# Patient Record
Sex: FEMALE | Race: WHITE | Employment: FULL TIME | URBAN - METROPOLITAN AREA
[De-identification: names, ages, dates, MRNs, and addresses within clinical notes are randomized per-mention and may not be internally consistent; named-entity substitution may affect disease eponyms.]

---

## 2017-03-15 ENCOUNTER — HOSPITAL ENCOUNTER (OUTPATIENT)
Dept: MRI IMAGING | Age: 57
Discharge: HOME | End: 2017-03-15
Attending: ORTHOPAEDIC SURGERY
Payer: COMMERCIAL

## 2017-03-15 VITALS — BODY MASS INDEX: 29.64 KG/M2 | WEIGHT: 170 LBS

## 2017-03-15 DIAGNOSIS — M54.16 LUMBAR RADICULOPATHY: ICD-10-CM

## 2017-03-15 DIAGNOSIS — M48.061 SPINAL STENOSIS, LUMBAR REGION, WITHOUT NEUROGENIC CLAUDICATION: ICD-10-CM

## 2017-03-15 PROCEDURE — 74011250636 HC RX REV CODE- 250/636: Performed by: ORTHOPAEDIC SURGERY

## 2017-03-15 PROCEDURE — A9585 GADOBUTROL INJECTION: HCPCS | Performed by: ORTHOPAEDIC SURGERY

## 2017-03-15 PROCEDURE — 72158 MRI LUMBAR SPINE W/O & W/DYE: CPT

## 2017-03-15 RX ADMIN — GADOBUTROL 7.5 ML: 604.72 INJECTION INTRAVENOUS at 08:11

## 2017-04-25 ENCOUNTER — HOSPITAL ENCOUNTER (OUTPATIENT)
Dept: PREADMISSION TESTING | Age: 57
Discharge: HOME | End: 2017-04-25
Payer: COMMERCIAL

## 2017-04-25 VITALS
WEIGHT: 182.32 LBS | RESPIRATION RATE: 16 BRPM | OXYGEN SATURATION: 96 % | HEIGHT: 63 IN | DIASTOLIC BLOOD PRESSURE: 86 MMHG | SYSTOLIC BLOOD PRESSURE: 141 MMHG | BODY MASS INDEX: 32.3 KG/M2 | TEMPERATURE: 98.5 F | HEART RATE: 80 BPM

## 2017-04-25 LAB
ABO + RH BLD: NORMAL
ALBUMIN SERPL BCP-MCNC: 4.4 G/DL (ref 3.5–5)
ALBUMIN/GLOB SERPL: 1.4 {RATIO} (ref 1.1–2.2)
ALP SERPL-CCNC: 108 U/L (ref 45–117)
ALT SERPL-CCNC: 37 U/L (ref 12–78)
ANION GAP BLD CALC-SCNC: 9 MMOL/L (ref 5–15)
APPEARANCE UR: CLEAR
APTT PPP: 28.7 SEC (ref 22.1–32.5)
AST SERPL W P-5'-P-CCNC: 23 U/L (ref 15–37)
ATRIAL RATE: 85 BPM
BACTERIA URNS QL MICRO: NEGATIVE /HPF
BASOPHILS # BLD AUTO: 0 K/UL (ref 0–0.1)
BASOPHILS # BLD: 1 % (ref 0–1)
BILIRUB DIRECT SERPL-MCNC: 0.1 MG/DL (ref 0–0.2)
BILIRUB SERPL-MCNC: 0.3 MG/DL (ref 0.2–1)
BILIRUB UR QL: NEGATIVE
BLOOD GROUP ANTIBODIES SERPL: NORMAL
BUN SERPL-MCNC: 15 MG/DL (ref 6–20)
BUN/CREAT SERPL: 25 (ref 12–20)
CALCIUM SERPL-MCNC: 9.1 MG/DL (ref 8.5–10.1)
CALCULATED P AXIS, ECG09: 59 DEGREES
CALCULATED R AXIS, ECG10: 18 DEGREES
CALCULATED T AXIS, ECG11: 28 DEGREES
CHLORIDE SERPL-SCNC: 102 MMOL/L (ref 97–108)
CO2 SERPL-SCNC: 30 MMOL/L (ref 21–32)
COLOR UR: NORMAL
CREAT SERPL-MCNC: 0.6 MG/DL (ref 0.55–1.02)
DIAGNOSIS, 93000: NORMAL
EOSINOPHIL # BLD: 0.1 K/UL (ref 0–0.4)
EOSINOPHIL NFR BLD: 2 % (ref 0–7)
EPITH CASTS URNS QL MICRO: NORMAL /LPF
ERYTHROCYTE [DISTWIDTH] IN BLOOD BY AUTOMATED COUNT: 13.2 % (ref 11.5–14.5)
EST. AVERAGE GLUCOSE BLD GHB EST-MCNC: 103 MG/DL
GLOBULIN SER CALC-MCNC: 3.1 G/DL (ref 2–4)
GLUCOSE SERPL-MCNC: 78 MG/DL (ref 65–100)
GLUCOSE UR STRIP.AUTO-MCNC: NEGATIVE MG/DL
HBA1C MFR BLD: 5.2 % (ref 4.2–6.3)
HCT VFR BLD AUTO: 45.1 % (ref 35–47)
HGB BLD-MCNC: 14.7 G/DL (ref 11.5–16)
HGB UR QL STRIP: NEGATIVE
HYALINE CASTS URNS QL MICRO: NORMAL /LPF (ref 0–5)
INR PPP: 1 (ref 0.9–1.1)
KETONES UR QL STRIP.AUTO: NEGATIVE MG/DL
LEUKOCYTE ESTERASE UR QL STRIP.AUTO: NEGATIVE
LYMPHOCYTES # BLD AUTO: 30 % (ref 12–49)
LYMPHOCYTES # BLD: 2 K/UL (ref 0.8–3.5)
MCH RBC QN AUTO: 29.2 PG (ref 26–34)
MCHC RBC AUTO-ENTMCNC: 32.6 G/DL (ref 30–36.5)
MCV RBC AUTO: 89.5 FL (ref 80–99)
MONOCYTES # BLD: 0.4 K/UL (ref 0–1)
MONOCYTES NFR BLD AUTO: 6 % (ref 5–13)
NEUTS SEG # BLD: 4 K/UL (ref 1.8–8)
NEUTS SEG NFR BLD AUTO: 61 % (ref 32–75)
NITRITE UR QL STRIP.AUTO: NEGATIVE
P-R INTERVAL, ECG05: 146 MS
PH UR STRIP: 7.5 [PH] (ref 5–8)
PLATELET # BLD AUTO: 301 K/UL (ref 150–400)
POTASSIUM SERPL-SCNC: 4.3 MMOL/L (ref 3.5–5.1)
PROT SERPL-MCNC: 7.5 G/DL (ref 6.4–8.2)
PROT UR STRIP-MCNC: NEGATIVE MG/DL
PROTHROMBIN TIME: 10.1 SEC (ref 9–11.1)
Q-T INTERVAL, ECG07: 382 MS
QRS DURATION, ECG06: 70 MS
QTC CALCULATION (BEZET), ECG08: 454 MS
RBC # BLD AUTO: 5.04 M/UL (ref 3.8–5.2)
RBC #/AREA URNS HPF: NORMAL /HPF (ref 0–5)
SODIUM SERPL-SCNC: 141 MMOL/L (ref 136–145)
SP GR UR REFRACTOMETRY: 1.01 (ref 1–1.03)
SPECIMEN EXP DATE BLD: NORMAL
THERAPEUTIC RANGE,PTTT: NORMAL SECS (ref 58–77)
UA: UC IF INDICATED,UAUC: NORMAL
UROBILINOGEN UR QL STRIP.AUTO: 0.2 EU/DL (ref 0.2–1)
VENTRICULAR RATE, ECG03: 85 BPM
WBC # BLD AUTO: 6.5 K/UL (ref 3.6–11)
WBC URNS QL MICRO: NORMAL /HPF (ref 0–4)

## 2017-04-25 PROCEDURE — 85610 PROTHROMBIN TIME: CPT | Performed by: ORTHOPAEDIC SURGERY

## 2017-04-25 PROCEDURE — 86900 BLOOD TYPING SEROLOGIC ABO: CPT | Performed by: ORTHOPAEDIC SURGERY

## 2017-04-25 PROCEDURE — 85025 COMPLETE CBC W/AUTO DIFF WBC: CPT | Performed by: ORTHOPAEDIC SURGERY

## 2017-04-25 PROCEDURE — 81001 URINALYSIS AUTO W/SCOPE: CPT | Performed by: ORTHOPAEDIC SURGERY

## 2017-04-25 PROCEDURE — 36415 COLL VENOUS BLD VENIPUNCTURE: CPT | Performed by: ORTHOPAEDIC SURGERY

## 2017-04-25 PROCEDURE — 80048 BASIC METABOLIC PNL TOTAL CA: CPT | Performed by: ORTHOPAEDIC SURGERY

## 2017-04-25 PROCEDURE — 85730 THROMBOPLASTIN TIME PARTIAL: CPT | Performed by: ORTHOPAEDIC SURGERY

## 2017-04-25 PROCEDURE — 93005 ELECTROCARDIOGRAM TRACING: CPT

## 2017-04-25 PROCEDURE — 83036 HEMOGLOBIN GLYCOSYLATED A1C: CPT | Performed by: ORTHOPAEDIC SURGERY

## 2017-04-25 PROCEDURE — 80076 HEPATIC FUNCTION PANEL: CPT | Performed by: ORTHOPAEDIC SURGERY
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
## 2017-04-25 NOTE — PERIOP NOTES
[facility]  PREOPERATIVE INSTRUCTIONS    Surgery Date: 5/1/2017  Surgery arrival time given by surgeon: NO   If no,[facility] [address] staff will call you between 4 PM- 8 PM the day before surgery with your arrival time. If your surgery is on a Monday, we will call you the preceding Friday. Please call [phone] after 8 PM if you did not receive your arrival time. 1. Please report at the designated time to the [address] [facility]. Bring your insurance card, photo identification, and any copayment ( if applicable). 2. You must have a responsible adult to drive you home. You need to have a responsible adult to stay with you the first 24 hours after surgery if you are going home the same day of your surgery and you should not drive a car for 24 hours following your surgery. 3. Nothing to eat or drink after midnight the night before surgery. This includes no water, gum, mints, coffee, juice, etc.  Please note special instructions, if applicable, below for medications. 4. MEDICATIONS TO TAKE THE MORNING OF SURGERY WITH A SIP OF WATER: none  5. You MAY take your pain medication the day of surgery with a sip of water. 6. No alcoholic beverages 24 hours before or after your surgery. 7. If you are being admitted to the hospital,please leave personal belongings/luggage in your car until you have an assigned hospital room number. 8. Stop Aspirin and/or any non-steroidal anti-inflammatory drugs (i.e. Ibuprofen, Naproxen, Advil, Aleve) as directed by your prescribing physician. You may take Tylenol. Stop herbal supplements 1 week prior to  surgery. 9. If you are currently taking Plavix, Coumadin,or any other blood-thinning/anticoagulant medication contact your prescribing physician for instructions. 10. Please wear comfortable clothes. Wear your glasses instead of contacts. We ask that all money, jewelry and valuables be left at home. Wear no make up, particularly mascara, the day of surgery.    11.  All body piercings, rings,and jewelry need to be removed and left at home. Please wear your hair loose or down. Please no pony-tails, buns, or any metal hair accessories. If you shower the morning of surgery, please do not apply any lotions, powders, or deodorants afterwards. Do not shave any body area within 24 hours of your surgery. 12. Please follow all instructions to avoid any potential surgical cancellation. 13. Should your physical condition change, (i.e. fever, cold, flu, etc.) please notify your surgeon as soon as possible. 14. It is important to be on time. If a situation occurs where you may be delayed, please call:  [phone] / [phone] on the day of surgery. 15. The Preadmission Testing staff can be reached at [phone]. 16. Special Instructions:  · Use Chlorhexidine Care Fusion wash and sponges 3 days prior to surgery as instructed. · Incentive spirometer given with instructions to practice at home and bring back to the hospital on the day of surgery. · [facility] will [facility] you if your Hemoglobin A1C is greater than 7.5. · Ensure/Glucerna  sample, nutritional information, and Ensure/Glucerna coupon given. · Pain pamphlet and Call Don't Fall reminder reviewed with patient. ·  parking is complimentary Monday - Friday 7 am - 5 pm  · Bring PTA Medication list day of surgery with the last doses taken documented  The patient was contacted  in person. She  verbalize  understanding of all instructions does not  need reinforcement.

## 2017-04-26 LAB
BACTERIA SPEC CULT: NORMAL
BACTERIA SPEC CULT: NORMAL
SERVICE CMNT-IMP: NORMAL

## 2017-04-30 ENCOUNTER — ANESTHESIA EVENT (OUTPATIENT)
Dept: SURGERY | Age: 57
DRG: 460 | End: 2017-04-30
Payer: COMMERCIAL

## 2017-05-01 ENCOUNTER — HOSPITAL ENCOUNTER (INPATIENT)
Age: 57
LOS: 3 days | Discharge: HOME HEALTH | DRG: 460 | End: 2017-05-04
Attending: ORTHOPAEDIC SURGERY | Admitting: ORTHOPAEDIC SURGERY
Payer: COMMERCIAL

## 2017-05-01 ENCOUNTER — APPOINTMENT (OUTPATIENT)
Dept: GENERAL RADIOLOGY | Age: 57
DRG: 460 | End: 2017-05-01
Attending: ORTHOPAEDIC SURGERY
Payer: COMMERCIAL

## 2017-05-01 ENCOUNTER — ANESTHESIA (OUTPATIENT)
Dept: SURGERY | Age: 57
DRG: 460 | End: 2017-05-01
Payer: COMMERCIAL

## 2017-05-01 PROBLEM — M48.062 LUMBAR STENOSIS WITH NEUROGENIC CLAUDICATION: Status: ACTIVE | Noted: 2017-05-01

## 2017-05-01 PROBLEM — M48.061 LUMBAR STENOSIS: Status: ACTIVE | Noted: 2017-05-01

## 2017-05-01 PROCEDURE — 0SG00AJ FUSION OF LUMBAR VERTEBRAL JOINT WITH INTERBODY FUSION DEVICE, POSTERIOR APPROACH, ANTERIOR COLUMN, OPEN APPROACH: ICD-10-PCS | Performed by: ORTHOPAEDIC SURGERY

## 2017-05-01 PROCEDURE — 77030008771 HC TU NG SALEM SUMP -A: Performed by: ANESTHESIOLOGY

## 2017-05-01 PROCEDURE — 77030026438 HC STYL ET INTUB CARD -A: Performed by: ANESTHESIOLOGY

## 2017-05-01 PROCEDURE — 74011250637 HC RX REV CODE- 250/637: Performed by: PHYSICIAN ASSISTANT

## 2017-05-01 PROCEDURE — 74011250636 HC RX REV CODE- 250/636

## 2017-05-01 PROCEDURE — 77030030107 HC BLD BN MILL DISP STRY -C: Performed by: ORTHOPAEDIC SURGERY

## 2017-05-01 PROCEDURE — 77030013079 HC BLNKT BAIR HGGR 3M -A: Performed by: ANESTHESIOLOGY

## 2017-05-01 PROCEDURE — 77030002933 HC SUT MCRYL J&J -A: Performed by: ORTHOPAEDIC SURGERY

## 2017-05-01 PROCEDURE — 65270000029 HC RM PRIVATE

## 2017-05-01 PROCEDURE — 77030020782 HC GWN BAIR PAWS FLX 3M -B

## 2017-05-01 PROCEDURE — 77030004391 HC BUR FLUT MEDT -C: Performed by: ORTHOPAEDIC SURGERY

## 2017-05-01 PROCEDURE — 76010000174 HC OR TIME 3.5 TO 4 HR INTENSV-TIER 1: Performed by: ORTHOPAEDIC SURGERY

## 2017-05-01 PROCEDURE — 76060000038 HC ANESTHESIA 3.5 TO 4 HR: Performed by: ORTHOPAEDIC SURGERY

## 2017-05-01 PROCEDURE — 76210000017 HC OR PH I REC 1.5 TO 2 HR: Performed by: ORTHOPAEDIC SURGERY

## 2017-05-01 PROCEDURE — 77030034850: Performed by: ORTHOPAEDIC SURGERY

## 2017-05-01 PROCEDURE — 77030018846 HC SOL IRR STRL H20 ICUM -A: Performed by: ORTHOPAEDIC SURGERY

## 2017-05-01 PROCEDURE — 74011000250 HC RX REV CODE- 250

## 2017-05-01 PROCEDURE — 77030037202 HC SPCR SPN BULL TIP PEK VBR IBF REGE -I1: Performed by: ORTHOPAEDIC SURGERY

## 2017-05-01 PROCEDURE — 77030003666 HC NDL SPINAL BD -A: Performed by: ORTHOPAEDIC SURGERY

## 2017-05-01 PROCEDURE — 77030026188 HC BN CANC CHP CRSH PR LIFV -E: Performed by: ORTHOPAEDIC SURGERY

## 2017-05-01 PROCEDURE — 77030008684 HC TU ET CUF COVD -B: Performed by: ANESTHESIOLOGY

## 2017-05-01 PROCEDURE — 77030018723 HC ELCTRD BLD COVD -A: Performed by: ORTHOPAEDIC SURGERY

## 2017-05-01 PROCEDURE — 00UT0JZ SUPPLEMENT SPINAL MENINGES WITH SYNTHETIC SUBSTITUTE, OPEN APPROACH: ICD-10-PCS | Performed by: ORTHOPAEDIC SURGERY

## 2017-05-01 PROCEDURE — 77030033067 HC SUT PDO STRATFX SPIR J&J -B: Performed by: ORTHOPAEDIC SURGERY

## 2017-05-01 PROCEDURE — C1713 ANCHOR/SCREW BN/BN,TIS/BN: HCPCS | Performed by: ORTHOPAEDIC SURGERY

## 2017-05-01 PROCEDURE — 77030031139 HC SUT VCRL2 J&J -A: Performed by: ORTHOPAEDIC SURGERY

## 2017-05-01 PROCEDURE — 0SB20ZZ EXCISION OF LUMBAR VERTEBRAL DISC, OPEN APPROACH: ICD-10-PCS | Performed by: ORTHOPAEDIC SURGERY

## 2017-05-01 PROCEDURE — 77030032490 HC SLV COMPR SCD KNE COVD -B: Performed by: ORTHOPAEDIC SURGERY

## 2017-05-01 PROCEDURE — 0SG0071 FUSION OF LUMBAR VERTEBRAL JOINT WITH AUTOLOGOUS TISSUE SUBSTITUTE, POSTERIOR APPROACH, POSTERIOR COLUMN, OPEN APPROACH: ICD-10-PCS | Performed by: ORTHOPAEDIC SURGERY

## 2017-05-01 PROCEDURE — 77030029099 HC BN WAX SSPC -A: Performed by: ORTHOPAEDIC SURGERY

## 2017-05-01 PROCEDURE — 77030037134 HC WRAP COMPR BACK THER SOLM -B

## 2017-05-01 PROCEDURE — 77030003161 HC GRFT DURA MTRX INLC -E: Performed by: ORTHOPAEDIC SURGERY

## 2017-05-01 PROCEDURE — 77010033678 HC OXYGEN DAILY

## 2017-05-01 PROCEDURE — 74011000250 HC RX REV CODE- 250: Performed by: ORTHOPAEDIC SURGERY

## 2017-05-01 PROCEDURE — 74011250636 HC RX REV CODE- 250/636: Performed by: ANESTHESIOLOGY

## 2017-05-01 PROCEDURE — 74011250636 HC RX REV CODE- 250/636: Performed by: ORTHOPAEDIC SURGERY

## 2017-05-01 PROCEDURE — 77030019908 HC STETH ESOPH SIMS -A: Performed by: ANESTHESIOLOGY

## 2017-05-01 PROCEDURE — 76001 XR FLUOROSCOPY OVER 60 MINUTES: CPT

## 2017-05-01 PROCEDURE — 77030012406 HC DRN WND PENRS BARD -A: Performed by: ORTHOPAEDIC SURGERY

## 2017-05-01 PROCEDURE — 74011000272 HC RX REV CODE- 272: Performed by: ORTHOPAEDIC SURGERY

## 2017-05-01 PROCEDURE — 77030034479 HC ADH SKN CLSR PRINEO J&J -B: Performed by: ORTHOPAEDIC SURGERY

## 2017-05-01 PROCEDURE — 77030012890

## 2017-05-01 PROCEDURE — 77030027138 HC INCENT SPIROMETER -A

## 2017-05-01 PROCEDURE — 74011250636 HC RX REV CODE- 250/636: Performed by: PHYSICIAN ASSISTANT

## 2017-05-01 PROCEDURE — 01NB0ZZ RELEASE LUMBAR NERVE, OPEN APPROACH: ICD-10-PCS | Performed by: ORTHOPAEDIC SURGERY

## 2017-05-01 PROCEDURE — 77030034274 HC GRFT BN CHIP ALLGRFT 10CC REGE -I: Performed by: ORTHOPAEDIC SURGERY

## 2017-05-01 DEVICE — 5.5MM CURVED ROD 45MM TI ALLOY
Type: IMPLANTABLE DEVICE | Site: SPINE LUMBAR | Status: FUNCTIONAL
Brand: TAURUS

## 2017-05-01 DEVICE — GRAFT BNE SUB 10CC CORT CANC DBM CRYOGENICALLY PRESERVED: Type: IMPLANTABLE DEVICE | Site: SPINE LUMBAR | Status: FUNCTIONAL

## 2017-05-01 DEVICE — SCR BNE SPNE 6.5X50MM TI -- STREAMLINE TL: Type: IMPLANTABLE DEVICE | Site: SPINE LUMBAR | Status: FUNCTIONAL

## 2017-05-01 DEVICE — SPACER SPNL 0.3CC W8XH8X6XL22MM PEEK CNVX BULL TIP TANT MRK: Type: IMPLANTABLE DEVICE | Site: SPINE LUMBAR | Status: FUNCTIONAL

## 2017-05-01 DEVICE — DURAGEN® SUTURABLE DURAL REGENERATION MATRIX, 2 IN X 2 IN (5 CM X 5 CM)
Type: IMPLANTABLE DEVICE | Site: SPINE LUMBAR | Status: FUNCTIONAL
Brand: DURAGEN® SUTURABLE

## 2017-05-01 DEVICE — SCR SET SPNE STREAMLINE TL --: Type: IMPLANTABLE DEVICE | Site: SPINE LUMBAR | Status: FUNCTIONAL

## 2017-05-01 DEVICE — GRAFT BNE 30CC 1 4MM CANC CRUSH CHIP READIGRFT: Type: IMPLANTABLE DEVICE | Site: SPINE LUMBAR | Status: FUNCTIONAL

## 2017-05-01 RX ORDER — CEFAZOLIN SODIUM IN 0.9 % NACL 2 G/50 ML
2 INTRAVENOUS SOLUTION, PIGGYBACK (ML) INTRAVENOUS ONCE
Status: DISCONTINUED | OUTPATIENT
Start: 2017-05-01 | End: 2017-05-01 | Stop reason: HOSPADM

## 2017-05-01 RX ORDER — LIDOCAINE HYDROCHLORIDE 10 MG/ML
0.1 INJECTION, SOLUTION EPIDURAL; INFILTRATION; INTRACAUDAL; PERINEURAL AS NEEDED
Status: DISCONTINUED | OUTPATIENT
Start: 2017-05-01 | End: 2017-05-01 | Stop reason: HOSPADM

## 2017-05-01 RX ORDER — SODIUM CHLORIDE 0.9 % (FLUSH) 0.9 %
5-10 SYRINGE (ML) INJECTION AS NEEDED
Status: DISCONTINUED | OUTPATIENT
Start: 2017-05-01 | End: 2017-05-01 | Stop reason: HOSPADM

## 2017-05-01 RX ORDER — SODIUM CHLORIDE 0.9 % (FLUSH) 0.9 %
5-10 SYRINGE (ML) INJECTION EVERY 8 HOURS
Status: DISCONTINUED | OUTPATIENT
Start: 2017-05-02 | End: 2017-05-04 | Stop reason: HOSPADM

## 2017-05-01 RX ORDER — SUCCINYLCHOLINE CHLORIDE 20 MG/ML
INJECTION INTRAMUSCULAR; INTRAVENOUS AS NEEDED
Status: DISCONTINUED | OUTPATIENT
Start: 2017-05-01 | End: 2017-05-01 | Stop reason: HOSPADM

## 2017-05-01 RX ORDER — CEFAZOLIN SODIUM IN 0.9 % NACL 2 G/50 ML
2 INTRAVENOUS SOLUTION, PIGGYBACK (ML) INTRAVENOUS EVERY 8 HOURS
Status: COMPLETED | OUTPATIENT
Start: 2017-05-01 | End: 2017-05-03

## 2017-05-01 RX ORDER — ONDANSETRON 2 MG/ML
INJECTION INTRAMUSCULAR; INTRAVENOUS AS NEEDED
Status: DISCONTINUED | OUTPATIENT
Start: 2017-05-01 | End: 2017-05-01 | Stop reason: HOSPADM

## 2017-05-01 RX ORDER — HYDROCORTISONE AND ACETIC ACID 20.75; 10.375 MG/ML; MG/ML
1 SOLUTION AURICULAR (OTIC)
Status: DISCONTINUED | OUTPATIENT
Start: 2017-05-01 | End: 2017-05-04 | Stop reason: HOSPADM

## 2017-05-01 RX ORDER — LIDOCAINE HYDROCHLORIDE 20 MG/ML
INJECTION, SOLUTION EPIDURAL; INFILTRATION; INTRACAUDAL; PERINEURAL AS NEEDED
Status: DISCONTINUED | OUTPATIENT
Start: 2017-05-01 | End: 2017-05-01 | Stop reason: HOSPADM

## 2017-05-01 RX ORDER — SODIUM CHLORIDE 0.9 % (FLUSH) 0.9 %
5-10 SYRINGE (ML) INJECTION AS NEEDED
Status: DISCONTINUED | OUTPATIENT
Start: 2017-05-01 | End: 2017-05-04 | Stop reason: HOSPADM

## 2017-05-01 RX ORDER — CEFAZOLIN SODIUM 1 G/3ML
INJECTION, POWDER, FOR SOLUTION INTRAMUSCULAR; INTRAVENOUS AS NEEDED
Status: DISCONTINUED | OUTPATIENT
Start: 2017-05-01 | End: 2017-05-01 | Stop reason: HOSPADM

## 2017-05-01 RX ORDER — NALOXONE HYDROCHLORIDE 0.4 MG/ML
0.4 INJECTION, SOLUTION INTRAMUSCULAR; INTRAVENOUS; SUBCUTANEOUS AS NEEDED
Status: DISCONTINUED | OUTPATIENT
Start: 2017-05-01 | End: 2017-05-04 | Stop reason: HOSPADM

## 2017-05-01 RX ORDER — OXYCODONE AND ACETAMINOPHEN 5; 325 MG/1; MG/1
1 TABLET ORAL
Status: DISCONTINUED | OUTPATIENT
Start: 2017-05-01 | End: 2017-05-02

## 2017-05-01 RX ORDER — ERGOCALCIFEROL 1.25 MG/1
50000 CAPSULE ORAL
Status: DISCONTINUED | OUTPATIENT
Start: 2017-05-07 | End: 2017-05-04 | Stop reason: HOSPADM

## 2017-05-01 RX ORDER — PROPOFOL 10 MG/ML
INJECTION, EMULSION INTRAVENOUS AS NEEDED
Status: DISCONTINUED | OUTPATIENT
Start: 2017-05-01 | End: 2017-05-01 | Stop reason: HOSPADM

## 2017-05-01 RX ORDER — SODIUM CHLORIDE, SODIUM LACTATE, POTASSIUM CHLORIDE, CALCIUM CHLORIDE 600; 310; 30; 20 MG/100ML; MG/100ML; MG/100ML; MG/100ML
125 INJECTION, SOLUTION INTRAVENOUS CONTINUOUS
Status: DISCONTINUED | OUTPATIENT
Start: 2017-05-01 | End: 2017-05-01 | Stop reason: HOSPADM

## 2017-05-01 RX ORDER — OXYCODONE AND ACETAMINOPHEN 5; 325 MG/1; MG/1
TABLET ORAL
COMMUNITY
End: 2017-05-04

## 2017-05-01 RX ORDER — ONDANSETRON 2 MG/ML
4 INJECTION INTRAMUSCULAR; INTRAVENOUS
Status: DISCONTINUED | OUTPATIENT
Start: 2017-05-01 | End: 2017-05-03

## 2017-05-01 RX ORDER — HYDROCHLOROTHIAZIDE 25 MG/1
25 TABLET ORAL DAILY
Status: DISCONTINUED | OUTPATIENT
Start: 2017-05-02 | End: 2017-05-04 | Stop reason: HOSPADM

## 2017-05-01 RX ORDER — FLUTICASONE PROPIONATE 50 MCG
2 SPRAY, SUSPENSION (ML) NASAL
Status: DISCONTINUED | OUTPATIENT
Start: 2017-05-01 | End: 2017-05-04 | Stop reason: HOSPADM

## 2017-05-01 RX ORDER — OXYCODONE AND ACETAMINOPHEN 10; 325 MG/1; MG/1
1 TABLET ORAL
Status: DISCONTINUED | OUTPATIENT
Start: 2017-05-01 | End: 2017-05-02

## 2017-05-01 RX ORDER — FENTANYL CITRATE 50 UG/ML
INJECTION, SOLUTION INTRAMUSCULAR; INTRAVENOUS AS NEEDED
Status: DISCONTINUED | OUTPATIENT
Start: 2017-05-01 | End: 2017-05-01 | Stop reason: HOSPADM

## 2017-05-01 RX ORDER — GABAPENTIN 300 MG/1
300 CAPSULE ORAL
Status: DISCONTINUED | OUTPATIENT
Start: 2017-05-01 | End: 2017-05-04 | Stop reason: HOSPADM

## 2017-05-01 RX ORDER — HYDROMORPHONE HYDROCHLORIDE 1 MG/ML
1 INJECTION, SOLUTION INTRAMUSCULAR; INTRAVENOUS; SUBCUTANEOUS
Status: ACTIVE | OUTPATIENT
Start: 2017-05-01 | End: 2017-05-02

## 2017-05-01 RX ORDER — AMOXICILLIN 250 MG
1 CAPSULE ORAL 2 TIMES DAILY
Status: DISCONTINUED | OUTPATIENT
Start: 2017-05-01 | End: 2017-05-04 | Stop reason: HOSPADM

## 2017-05-01 RX ORDER — FACIAL-BODY WIPES
10 EACH TOPICAL DAILY PRN
Status: DISCONTINUED | OUTPATIENT
Start: 2017-05-03 | End: 2017-05-04 | Stop reason: HOSPADM

## 2017-05-01 RX ORDER — HYDROMORPHONE HYDROCHLORIDE 1 MG/ML
.25-1 INJECTION, SOLUTION INTRAMUSCULAR; INTRAVENOUS; SUBCUTANEOUS
Status: DISCONTINUED | OUTPATIENT
Start: 2017-05-01 | End: 2017-05-01 | Stop reason: HOSPADM

## 2017-05-01 RX ORDER — DIPHENHYDRAMINE HYDROCHLORIDE 50 MG/ML
12.5 INJECTION, SOLUTION INTRAMUSCULAR; INTRAVENOUS AS NEEDED
Status: DISCONTINUED | OUTPATIENT
Start: 2017-05-01 | End: 2017-05-01 | Stop reason: HOSPADM

## 2017-05-01 RX ORDER — DIAZEPAM 5 MG/1
5 TABLET ORAL
Status: DISCONTINUED | OUTPATIENT
Start: 2017-05-01 | End: 2017-05-04 | Stop reason: HOSPADM

## 2017-05-01 RX ORDER — ROCURONIUM BROMIDE 10 MG/ML
INJECTION, SOLUTION INTRAVENOUS AS NEEDED
Status: DISCONTINUED | OUTPATIENT
Start: 2017-05-01 | End: 2017-05-01 | Stop reason: HOSPADM

## 2017-05-01 RX ORDER — HYDROMORPHONE HCL IN 0.9% NACL 15 MG/30ML
PATIENT CONTROLLED ANALGESIA VIAL INTRAVENOUS
Status: DISCONTINUED | OUTPATIENT
Start: 2017-05-01 | End: 2017-05-04

## 2017-05-01 RX ORDER — DEXAMETHASONE SODIUM PHOSPHATE 4 MG/ML
INJECTION, SOLUTION INTRA-ARTICULAR; INTRALESIONAL; INTRAMUSCULAR; INTRAVENOUS; SOFT TISSUE AS NEEDED
Status: DISCONTINUED | OUTPATIENT
Start: 2017-05-01 | End: 2017-05-01 | Stop reason: HOSPADM

## 2017-05-01 RX ORDER — HYDROMORPHONE HYDROCHLORIDE 2 MG/ML
INJECTION, SOLUTION INTRAMUSCULAR; INTRAVENOUS; SUBCUTANEOUS AS NEEDED
Status: DISCONTINUED | OUTPATIENT
Start: 2017-05-01 | End: 2017-05-01 | Stop reason: HOSPADM

## 2017-05-01 RX ORDER — VANCOMYCIN HYDROCHLORIDE 1 G/20ML
INJECTION, POWDER, LYOPHILIZED, FOR SOLUTION INTRAVENOUS AS NEEDED
Status: DISCONTINUED | OUTPATIENT
Start: 2017-05-01 | End: 2017-05-01 | Stop reason: HOSPADM

## 2017-05-01 RX ORDER — SODIUM CHLORIDE 9 MG/ML
125 INJECTION, SOLUTION INTRAVENOUS CONTINUOUS
Status: DISPENSED | OUTPATIENT
Start: 2017-05-01 | End: 2017-05-02

## 2017-05-01 RX ORDER — ACETAMINOPHEN 325 MG/1
650 TABLET ORAL EVERY 6 HOURS
Status: DISCONTINUED | OUTPATIENT
Start: 2017-05-01 | End: 2017-05-04 | Stop reason: HOSPADM

## 2017-05-01 RX ORDER — DIPHENHYDRAMINE HYDROCHLORIDE 50 MG/ML
12.5 INJECTION, SOLUTION INTRAMUSCULAR; INTRAVENOUS
Status: DISCONTINUED | OUTPATIENT
Start: 2017-05-01 | End: 2017-05-04 | Stop reason: HOSPADM

## 2017-05-01 RX ORDER — SODIUM CHLORIDE 0.9 % (FLUSH) 0.9 %
5-10 SYRINGE (ML) INJECTION EVERY 8 HOURS
Status: DISCONTINUED | OUTPATIENT
Start: 2017-05-01 | End: 2017-05-01 | Stop reason: HOSPADM

## 2017-05-01 RX ORDER — FLUMAZENIL 0.1 MG/ML
0.2 INJECTION INTRAVENOUS
Status: DISCONTINUED | OUTPATIENT
Start: 2017-05-01 | End: 2017-05-01 | Stop reason: HOSPADM

## 2017-05-01 RX ORDER — MIDAZOLAM HYDROCHLORIDE 1 MG/ML
INJECTION, SOLUTION INTRAMUSCULAR; INTRAVENOUS AS NEEDED
Status: DISCONTINUED | OUTPATIENT
Start: 2017-05-01 | End: 2017-05-01 | Stop reason: HOSPADM

## 2017-05-01 RX ORDER — NALOXONE HYDROCHLORIDE 0.4 MG/ML
0.2 INJECTION, SOLUTION INTRAMUSCULAR; INTRAVENOUS; SUBCUTANEOUS
Status: DISCONTINUED | OUTPATIENT
Start: 2017-05-01 | End: 2017-05-01 | Stop reason: HOSPADM

## 2017-05-01 RX ADMIN — SODIUM CHLORIDE, SODIUM LACTATE, POTASSIUM CHLORIDE, AND CALCIUM CHLORIDE: 600; 310; 30; 20 INJECTION, SOLUTION INTRAVENOUS at 10:13

## 2017-05-01 RX ADMIN — LIDOCAINE HYDROCHLORIDE 40 MG: 20 INJECTION, SOLUTION EPIDURAL; INFILTRATION; INTRACAUDAL; PERINEURAL at 09:33

## 2017-05-01 RX ADMIN — MIDAZOLAM HYDROCHLORIDE 2 MG: 1 INJECTION, SOLUTION INTRAMUSCULAR; INTRAVENOUS at 09:30

## 2017-05-01 RX ADMIN — DIAZEPAM 5 MG: 5 TABLET ORAL at 21:41

## 2017-05-01 RX ADMIN — HYDROMORPHONE HYDROCHLORIDE 1 MG: 2 INJECTION, SOLUTION INTRAMUSCULAR; INTRAVENOUS; SUBCUTANEOUS at 10:00

## 2017-05-01 RX ADMIN — CEFAZOLIN 2 G: 1 INJECTION, POWDER, FOR SOLUTION INTRAMUSCULAR; INTRAVENOUS; PARENTERAL at 23:51

## 2017-05-01 RX ADMIN — FENTANYL CITRATE 50 MCG: 50 INJECTION, SOLUTION INTRAMUSCULAR; INTRAVENOUS at 09:44

## 2017-05-01 RX ADMIN — DOCUSATE SODIUM -SENNOSIDES 1 TABLET: 50; 8.6 TABLET, COATED ORAL at 18:19

## 2017-05-01 RX ADMIN — FENTANYL CITRATE 50 MCG: 50 INJECTION, SOLUTION INTRAMUSCULAR; INTRAVENOUS at 10:32

## 2017-05-01 RX ADMIN — SUCCINYLCHOLINE CHLORIDE 100 MG: 20 INJECTION INTRAMUSCULAR; INTRAVENOUS at 09:33

## 2017-05-01 RX ADMIN — Medication: at 23:53

## 2017-05-01 RX ADMIN — SODIUM CHLORIDE, SODIUM LACTATE, POTASSIUM CHLORIDE, AND CALCIUM CHLORIDE 125 ML/HR: 600; 310; 30; 20 INJECTION, SOLUTION INTRAVENOUS at 08:05

## 2017-05-01 RX ADMIN — ROCURONIUM BROMIDE 5 MG: 10 INJECTION, SOLUTION INTRAVENOUS at 09:33

## 2017-05-01 RX ADMIN — ACETAMINOPHEN 650 MG: 325 TABLET ORAL at 23:51

## 2017-05-01 RX ADMIN — FENTANYL CITRATE 50 MCG: 50 INJECTION, SOLUTION INTRAMUSCULAR; INTRAVENOUS at 10:19

## 2017-05-01 RX ADMIN — CEFAZOLIN 2 G: 1 INJECTION, POWDER, FOR SOLUTION INTRAMUSCULAR; INTRAVENOUS; PARENTERAL at 16:41

## 2017-05-01 RX ADMIN — ACETAMINOPHEN 650 MG: 325 TABLET ORAL at 18:19

## 2017-05-01 RX ADMIN — ROCURONIUM BROMIDE 10 MG: 10 INJECTION, SOLUTION INTRAVENOUS at 10:03

## 2017-05-01 RX ADMIN — DEXAMETHASONE SODIUM PHOSPHATE 8 MG: 4 INJECTION, SOLUTION INTRA-ARTICULAR; INTRALESIONAL; INTRAMUSCULAR; INTRAVENOUS; SOFT TISSUE at 09:59

## 2017-05-01 RX ADMIN — ROCURONIUM BROMIDE 35 MG: 10 INJECTION, SOLUTION INTRAVENOUS at 09:38

## 2017-05-01 RX ADMIN — HYDROMORPHONE HYDROCHLORIDE 0.5 MG: 2 INJECTION, SOLUTION INTRAMUSCULAR; INTRAVENOUS; SUBCUTANEOUS at 12:26

## 2017-05-01 RX ADMIN — CEFAZOLIN SODIUM 2 G: 1 INJECTION, POWDER, FOR SOLUTION INTRAMUSCULAR; INTRAVENOUS at 09:56

## 2017-05-01 RX ADMIN — ONDANSETRON 4 MG: 2 INJECTION INTRAMUSCULAR; INTRAVENOUS at 12:05

## 2017-05-01 RX ADMIN — SODIUM CHLORIDE 125 ML/HR: 900 INJECTION, SOLUTION INTRAVENOUS at 21:40

## 2017-05-01 RX ADMIN — Medication: at 13:46

## 2017-05-01 RX ADMIN — PROPOFOL 150 MG: 10 INJECTION, EMULSION INTRAVENOUS at 09:33

## 2017-05-01 RX ADMIN — FENTANYL CITRATE 100 MCG: 50 INJECTION, SOLUTION INTRAMUSCULAR; INTRAVENOUS at 09:33

## 2017-05-01 RX ADMIN — SODIUM CHLORIDE, SODIUM LACTATE, POTASSIUM CHLORIDE, AND CALCIUM CHLORIDE: 600; 310; 30; 20 INJECTION, SOLUTION INTRAVENOUS at 12:19

## 2017-05-01 NOTE — IP AVS SNAPSHOT
Current Discharge Medication List  
  
START taking these medications Dose & Instructions Dispensing Information Comments Morning Noon Evening Bedtime  
 docusate sodium 100 mg capsule Commonly known as:  Yoel Gonzales Your last dose was: Your next dose is:    
   
   
 Dose:  100 mg Take 1 Cap by mouth two (2) times a day for 90 days. Quantity:  60 Cap Refills:  2 HYDROmorphone 2 mg tablet Commonly known as:  DILAUDID Your last dose was: Your next dose is:    
   
   
 Dose:  2-4 mg Take 1-2 Tabs by mouth every four (4) hours as needed. Max Daily Amount: 24 mg. Quantity:  80 Tab Refills:  0  
     
   
   
   
  
 promethazine 25 mg tablet Commonly known as:  PHENERGAN Your last dose was: Your next dose is:    
   
   
 Dose:  25 mg Take 1 Tab by mouth every six (6) hours as needed for Nausea. Quantity:  1 Tab Refills:  0 CONTINUE these medications which have NOT CHANGED Dose & Instructions Dispensing Information Comments Morning Noon Evening Bedtime ACETASOL HC otic solution Generic drug:  hydrocortisone-acetic acid Your last dose was: Your next dose is:    
   
   
 Dose:  1 Drop Administer 1 Drop into each ear two (2) times daily as needed. Refills:  0  
     
   
   
   
  
 COLLAGEN Your last dose was: Your next dose is:    
   
   
 Dose:  1 Tab Take 1 Tab by mouth daily. Refills:  0  
     
   
   
   
  
 cyclobenzaprine 10 mg tablet Commonly known as:  FLEXERIL Your last dose was: Your next dose is: Take  by mouth three (3) times daily as needed for Muscle Spasm(s). Refills:  0  
     
   
   
   
  
 ergocalciferol 50,000 unit capsule Commonly known as:  ERGOCALCIFEROL Your last dose was: Your next dose is:    
   
   
 Dose:  11019 Units Take 50,000 Units by mouth every seven (7) days. Refills:  0  
     
   
   
   
  
 fluticasone 50 mcg/actuation nasal spray Commonly known as:  Karla Six Your last dose was: Your next dose is:    
   
   
 Dose:  2 Spray 2 Sprays by Both Nostrils route two (2) times daily as needed for Rhinitis. Refills:  0  
     
   
   
   
  
 gabapentin 300 mg capsule Commonly known as:  NEURONTIN Your last dose was: Your next dose is:    
   
   
 Dose:  300 mg Take 300 mg by mouth nightly as needed. Refills:  0  
     
   
   
   
  
 hydroCHLOROthiazide 25 mg tablet Commonly known as:  HYDRODIURIL Your last dose was: Your next dose is:    
   
   
 Dose:  25 mg Take 25 mg by mouth daily. Refills:  0 STOP taking these medications PERCOCET 5-325 mg per tablet Generic drug:  oxyCODONE-acetaminophen Where to Get Your Medications Information on where to get these meds will be given to you by the nurse or doctor. ! Ask your nurse or doctor about these medications  
  docusate sodium 100 mg capsule HYDROmorphone 2 mg tablet  
 promethazine 25 mg tablet

## 2017-05-01 NOTE — ANESTHESIA PREPROCEDURE EVALUATION
Anesthetic History   No history of anesthetic complications            Review of Systems / Medical History  Patient summary reviewed and pertinent labs reviewed    Pulmonary  Within defined limits                 Neuro/Psych   Within defined limits           Cardiovascular                Pertinent negatives: No hypertension  Exercise tolerance: >4 METS     GI/Hepatic/Renal  Within defined limits              Endo/Other        Arthritis     Other Findings   Comments: Chronic pain           Physical Exam    Airway  Mallampati: II  TM Distance: 4 - 6 cm  Neck ROM: decreased range of motion   Mouth opening: Normal     Cardiovascular    Rhythm: regular  Rate: normal         Dental  No notable dental hx       Pulmonary  Breath sounds clear to auscultation               Abdominal         Other Findings            Anesthetic Plan    ASA: 2  Anesthesia type: general            Anesthetic plan and risks discussed with: Patient and Family

## 2017-05-01 NOTE — ANESTHESIA POSTPROCEDURE EVALUATION
Post-Anesthesia Evaluation and Assessment    Patient: Eduardo Ross MRN: [id number]  SSN: [id number]    YOB: 1960  Age: 61 y.o. Sex: female       Cardiovascular Function/Vital Signs  Visit Vitals    /86    Pulse (!) 106    Temp 36.4 °C (97.5 °F)    Resp 10    Ht 5' 3\" (1.6 m)    Wt 82.7 kg (182 lb 5.1 oz)    SpO2 99%    BMI 32.3 kg/m2       Patient is status post general anesthesia for Procedure(s):  L3-L4 REVISION LAMINECTOMY, FUSION, INSTRUMENTATION, TLIF, BONEGRAFT. Nausea/Vomiting: None    Postoperative hydration reviewed and adequate. Pain:  Pain Scale 1: Numeric (0 - 10) (PATIENT AWAKE SWITCHED TO NUMERIC PAIN SCALE) (05/01/17 1416)  Pain Intensity 1: 0 (05/01/17 1416)   Managed    Neurological Status:   Neuro (WDL): Within Defined Limits (05/01/17 1416)  Neuro  Neurologic State: Alert;Eyes open spontaneously; Eyes open to voice (05/01/17 1416)  Orientation Level: Oriented to person;Oriented to place;Oriented to situation (05/01/17 1416)  Cognition: Follows commands (05/01/17 1416)  Speech: Clear (05/01/17 1416)  LUE Motor Response: Purposeful (STRONG GRASP) (05/01/17 1416)  LLE Motor Response: Purposeful;Spontaneous  (WIGGLE TOES,STRONG PLANTAR DORSI FLEXION) (05/01/17 1416)  RUE Motor Response: Purposeful (STRONG GRASP) (05/01/17 1416)  RLE Motor Response: Purposeful;Spontaneous  (ADEQUATE PLANTAER FORSI FLEXION) (05/01/17 1416)   At baseline    Mental Status and Level of Consciousness: Arousable    Pulmonary Status:   O2 Device: Nasal cannula (05/01/17 1416)   Adequate oxygenation and airway patent    Complications related to anesthesia: None    Post-anesthesia assessment completed.  No concerns    Signed By: Jose Beauchamp MD     May 1, 2017

## 2017-05-01 NOTE — H&P
Date of Surgery Update:  Saleem Weber was seen and examined. History and physical has been reviewed. The patient has been examined.  There have been no significant clinical changes since the completion of the originally dated History and Physical.    Signed By: Ghada Bueno MD     May 1, 2017 7:26 AM

## 2017-05-01 NOTE — PERIOP NOTES
TRANSFER - OUT REPORT:    Verbal report given to ATIF Ramos on Damon Wang  being transferred to [address] [place] [address]( for routine post - op       Report consisted of patients Situation, Background, Assessment and   Recommendations(SBAR). Information from the following report(s) SBAR, Procedure Summary, Intake/Output and MAR was reviewed with the receiving nurse. Lines:   Peripheral IV 05/01/17 Left Arm (Active)   Site Assessment Clean, dry, & intact 5/1/2017  2:30 PM   Phlebitis Assessment 0 5/1/2017  2:30 PM   Infiltration Assessment 0 5/1/2017  2:30 PM   Dressing Status Clean, dry, & intact; Occlusive 5/1/2017  2:30 PM   Dressing Type Tape;Transparent 5/1/2017  2:30 PM   Hub Color/Line Status Pink; Infusing 5/1/2017  2:30 PM        Opportunity for questions and clarification was provided.       Patient transported with:   O2 @ 3 liters  Registered Nurse

## 2017-05-01 NOTE — IP AVS SNAPSHOT
Katie Arana 
 
 
 [address] 
[phone] Patient: Stephanie Simon MRN: [id number]:[id number] You are allergic to the following Allergen Reactions Bc (Aspirin-Salicylamide-Caffeine) Hives Boniva (Ibandronate) Myalgia Muscle weakness and dysphagia Iodine Rash Fosamax (Alendronate) Myalgia Muscle weakness and dysphagia Recent Documentation Height Weight Breastfeeding? BMI OB Status Smoking Status 1.6 m 82.7 kg No 32.3 kg/m2 Ablation Never Smoker Emergency Contacts Name Discharge Info Relation Home Work Mobile AntoninaanantLeon DISCHARGE CAREGIVER [3] Spouse [3]  [phone] About your hospitalization You were admitted on:  May 1, 2017 You last received care in the:  [facility] 4M POST SURG ORT 1 You were discharged on: May 4, 2017 Unit phone number:  [phone] Why you were hospitalized Your primary diagnosis was:  Not on File Your diagnoses also included:  Lumbar Stenosis With Neurogenic Claudication, Lumbar Stenosis Providers Seen During Your Hospitalizations Provider Role Specialty Primary office phone Cricket Napoles MD Attending Provider Orthopedic Surgery [phone] Your Primary Care Physician (PCP) Primary Care Physician Office Phone Office Fax Allegra Agent [phone] Follow-up Information Follow up With Details Comments Contact Info AT [address] Nelia Daly [address] [place] [address] [phone] [facility]  [address] [address] 
[phone] Kirstie Whitmore MD   [address] 
[address] [place] [id number] [phone] Current Discharge Medication List  
  
START taking these medications Dose & Instructions Dispensing Information Comments Morning Noon Evening Bedtime docusate sodium 100 mg capsule Commonly known as:  Randee Riley Your last dose was: Your next dose is:    
   
   
 Dose:  100 mg Take 1 Cap by mouth two (2) times a day for 90 days. Quantity:  60 Cap Refills:  2 HYDROmorphone 2 mg tablet Commonly known as:  DILAUDID Your last dose was: Your next dose is:    
   
   
 Dose:  2-4 mg Take 1-2 Tabs by mouth every four (4) hours as needed. Max Daily Amount: 24 mg. Quantity:  80 Tab Refills:  0  
     
   
   
   
  
 promethazine 25 mg tablet Commonly known as:  PHENERGAN Your last dose was: Your next dose is:    
   
   
 Dose:  25 mg Take 1 Tab by mouth every six (6) hours as needed for Nausea. Quantity:  1 Tab Refills:  0 CONTINUE these medications which have NOT CHANGED Dose & Instructions Dispensing Information Comments Morning Noon Evening Bedtime ACETASOL HC otic solution Generic drug:  hydrocortisone-acetic acid Your last dose was: Your next dose is:    
   
   
 Dose:  1 Drop Administer 1 Drop into each ear two (2) times daily as needed. Refills:  0  
     
   
   
   
  
 COLLAGEN Your last dose was: Your next dose is:    
   
   
 Dose:  1 Tab Take 1 Tab by mouth daily. Refills:  0  
     
   
   
   
  
 cyclobenzaprine 10 mg tablet Commonly known as:  FLEXERIL Your last dose was: Your next dose is: Take  by mouth three (3) times daily as needed for Muscle Spasm(s). Refills:  0  
     
   
   
   
  
 ergocalciferol 50,000 unit capsule Commonly known as:  ERGOCALCIFEROL Your last dose was: Your next dose is:    
   
   
 Dose:  92316 Units Take 50,000 Units by mouth every seven (7) days. Refills:  0  
     
   
   
   
  
 fluticasone 50 mcg/actuation nasal spray Commonly known as:  Fortunato Bumpers Your last dose was: Your next dose is:    
   
   
 Dose:  2 Spray 2 Sprays by Both Nostrils route two (2) times daily as needed for Rhinitis. Refills:  0  
     
   
   
   
  
 gabapentin 300 mg capsule Commonly known as:  NEURONTIN Your last dose was: Your next dose is:    
   
   
 Dose:  300 mg Take 300 mg by mouth nightly as needed. Refills:  0  
     
   
   
   
  
 hydroCHLOROthiazide 25 mg tablet Commonly known as:  HYDRODIURIL Your last dose was: Your next dose is:    
   
   
 Dose:  25 mg Take 25 mg by mouth daily. Refills:  0 STOP taking these medications PERCOCET 5-325 mg per tablet Generic drug:  oxyCODONE-acetaminophen Where to Get Your Medications Information on where to get these meds will be given to you by the nurse or doctor. ! Ask your nurse or doctor about these medications  
  docusate sodium 100 mg capsule HYDROmorphone 2 mg tablet  
 promethazine 25 mg tablet Discharge Instructions Lumbar Spinal Surgery Discharge Instructions Dr. Yaneth Arnett [address] [place] [address] [phone] Activity:   
? You are going home a well person, be as active as possible. Your only exercise should be walking. Start with short frequent walks and increase your walking distance each day. Start with walking twice a day for 5 minutes and increase your distance each day 2-3 minutes until you reach 25 minutes twice a day. Limit the amount of time you sit to 20-30 minute intervals. Sitting for prolonged periods of time will be uncomfortable for you following your surgery. ? No bending, lifting (of 5lbs or more), twisting, or straining. ? Do not drive until your doctor states you may do so. However, you may ride in a car for short distances. ? If you are required to wear a brace, you should wear it at all times when you are out of bed.  You may remove it when sleeping unless your physician advises you against it. ? When you are in the bed, you may lay on your back or on either side. Do not lie on your stomach. ? You may resume sexual relations 4-6 weeks after surgery. ? Continue to use your incentive spirometer for deep breathing exercises. Driving: ? You may not drive or return to work until instructed by your physician. However, you may ride in the car for short periods of time. Brace: ? If you have a back brace, you should wear your brace at all times when you are out of bed. Do not wear the brace while in bed or showering. ? Remember to always wear a cotton t-shirt underneath your brace. ? Do not bend or twist when your brace is off. Diet: 
? You may resume your regular home diet as tolerated. If your throat is sore, you should eat soft foods for the first couple of days. ? Be sure to drink plenty of fluids; it is important to keep yourself hydrated. ? Avoid alcoholic beverages and ABSOLUTELY NO tobacco products. Tobacco products will interfere with your healing. If you continue to use tobacco, you may end up needing another surgery in the future. Dressing: You have on a Prineo dressing. This is a waterproof bacteriostatic dressing that requires no post-operative care. Please do not peel the dressing off, or apply any oil based products, as they may expedite the deterioration of the mesh. The dressing will slowly chip off on its own. 
? Do not rub or apply lotion or ointments to incision site. Shower: ? You may shower 5 days after your surgery. ? You may remove your brace during showers. ? NO not use tub baths, swimming pools or Jacuzzis. Medications: 
? Check with your physician before taking any anti-inflammatory medications. (Advil, Aleve, Ibuprofen, Aspirin) ? Take your pain medication as directed ? Do NOT take additional Tylenol if your prescribed pain medication has acetaminophen in it (Endocet/Percocet, Lortab, Norco) ? It is important to have regular bowel movements. Pain medications can be constipating. Stool softeners, warm prune juice, increasing your water intake, and increasing fiber in your diet can help in preventing constipation. ? Do NOT take laxatives if at all possible except in severe situations. It can result in a vicious cycle of constipation and diarrhea. Stockings: ? You have been given T.E.D. stockings to wear. Continue to wear these for 7 days after your discharge. Put them on in the morning and take them off at night. They are used to increase your circulation and prevent blood clots from forming in your legs. Follow-Up ? Please call ASAP to schedule your 1st post-operative appointment. This should be approximately 3 weeks from your surgery date. Notify your physician in you develop any of the following conditions: 
? Fever above 101 degrees for 24 hours. ? Nausea or vomiting. ? Severe headache. 
? Inability to urinate ? Loss of bowel or bladder function (sudden onset of incontinence) ? Changes in sensation in your extremities (numbness, tingling, loss of color). ? Severe pain or pain not relieved by medications. ? Redness, swelling, or drainage from your incision. ? Persistent pain in the chest.  
? Pain in the calf of either leg. 
? Increased weakness (if this is greater than before your surgery). If you have any questions about your dressing contact your Orthopaedic Surgeons office. Discharge Orders None Introducing [facility]! Silvia Salter introduces [employer] patient portal. Now you can access parts of your medical record, email your doctor's office, and request medication refills online. 1. In your internet browser, go to https://[email or web address] 2. Click on the First Time User? Click Here link in the Sign In box. You will see the New Member Sign Up page. 3. Enter your [employer] Access Code exactly as it appears below.  You will not need to use this code after youve completed the sign-up process. If you do not sign up before the expiration date, you must request a new code. · [employer] Access Code: [email or web address] Expires: 5/30/2017  4:40 PM 
 
4. Enter the last four digits of your Social Security Number (xxxx) and Date of Birth (mm/dd/yyyy) as indicated and click Submit. You will be taken to the next sign-up page. 5. Create a [employer] ID. This will be your [employer] login ID and cannot be changed, so think of one that is secure and easy to remember. 6. Create a [employer] password. You can change your password at any time. 7. Enter your Password Reset Question and Answer. This can be used at a later time if you forget your password. 8. Enter your e-mail address. You will receive e-mail notification when new information is available in [address] 9. Click Sign Up. You can now view and download portions of your medical record. 10. Click the Download Summary menu link to download a portable copy of your medical information. If you have questions, please visit the Frequently Asked Questions section of the [employer] website. Remember, [employer] is NOT to be used for urgent needs. For medical emergencies, dial 911. Now available from your iPhone and Android! General Information Please provide this summary of care documentation to your next provider. Patient Signature:  ____________________________________________________________ Date:  ____________________________________________________________  
  
Caity Mi Provider Signature:  ____________________________________________________________ Date:  ____________________________________________________________

## 2017-05-01 NOTE — PROGRESS NOTES
Primary Nurse Abilio Paige RN and Matti Mcguire RN performed a dual skin assessment on this patient No impairment noted except bruise to forehead from positioning during surgery.   Adis score is 21

## 2017-05-01 NOTE — OP NOTES
[address]  [address] [facility], [address] [facility]    OPERATIVE REPORT      NAME: Deanna Rose    AGE: 61 y.o. YOB: 1960    MEDICAL RECORD NUMBER: [id number]    DATE OF SURGERY: 5/1/2017    PREOPERATIVE DIAGNOSES:  1. Lumbar stenosis. 2. Acquired lumbar spondylolisthesis. POSTOPERATIVE DIAGNOSES:   1. Lumbar stenosis. 2. Acquired lumbar spondylolisthesis. OPERATIVE PROCEDURES:  1. Revision bilateral laminotomy, partial facetectomy, and foraminotomy of L3-L4. 2. Posterolateral fusion and posterior lumbar interbody fusion of L3-L4. 3. Pedicle screw instrumentation with pedicle screws for L3 and L4 bilaterally [place]. 4. Application of biomechanical intervertebral body device at L3-L4 [place]. 5. Morselized allograft for spine surgery and local autograft for spine surgery with stem cells. SURGEON: Jenny Weldon MD     ASSISTANT: BETTINA Morales    ANESTHESIA: General    ESTIMATED BLOOD LOSS: 038 cc    COMPLICATIONS: None apparent    NEUROMONITORING: We used SSEPs and spontaneous EMGs with pedicle screw stimulation. INDICATION: The patient is a very pleasant 61 y.o. female with debilitating leg pain and back pain. She failed conservative measures. She elected to proceed with operative intervention. She was aware of the risks, benefits, and alternatives. She provided informed consent. DESCRIPTION OF PROCEDURE: The patient was identified in the preoperative holding area. The lumbar spine was marked by me. She was transferred to the operating room where general anesthesia was given. She was also given perioperative antibiotics. She was placed prone on the Juan Purchase table. All bony prominences were well padded. The lumbar spine was prepped and draped in the usual standard fashion. We performed a surgical time out. I made a skin incision from L3 to L5. I exposed the posterior lumbar spine in standard fashion.  I took intraoperative fluoroscopy to verify our levels. I exposed the transverse processes of L3 and L4 bilaterally. I performed a revision laminotomy of L3-L4 with partial facetectomy and foraminotomy to decompress the thecal sac and nerve roots of L3 and L4. I performed a transforaminal approach to the L3-L4 disc on the left side. I removed the inferior articular process of L3 on the left side. I decompressed the stenosis found within the foramen and lateral to the foramen for L3-L4. At this point our transforaminal approach to L3-L4 on the left side was complete with exposure of the left L3-L4 disc space. I then performed a standard discectomy at L3-L4. I prepared the endplates to bleeding bone. I performed trial sizing. I placed a biomechanical device into L3-L4 with the appropriate amount of tension and alignment. I placed local autograft nto the biomechanical device. I then cannulated our pedicles for L3 and L4 bilaterally in standard fashion. I probed each pedicle. I copiously irrigated the entire wound. I decorticated our fusion beds bilaterally with a high-speed jericho. I placed our bone graft into our fusions beds bilaterally. I then placed pedicle screws for L3 and L4 bilaterally in standard fashion under fluoroscopic guidance. I had good purchase for each pedicle. I then stimulated all 4 pedicle screws with pedicle screw stimulation. The pedicle screws were found to be within the appropriate range of amplitude. I then placed contoured rods on top of the pedicles bilaterally. The rods were locked to the pedicle screws according to the 's specification with set screws. We had good hemostasis. There was no CSF leaking. The fusion beds were packed with morselized allograft and local autograft. The exposed neurologic elements were protected with Duragen. I injected the soft tissues with 0.5% Marcaine. A deep drain was placed. The wound was closed in layers.  A sterile dressing was applied. The patient was extubated and transferred to the recovery room in good medical condition. I, Dr. Lynette Kuo, performed the above procedures.     Lynette Kuo MD  5/1/2017

## 2017-05-02 LAB
ANION GAP BLD CALC-SCNC: 10 MMOL/L (ref 5–15)
BUN SERPL-MCNC: 10 MG/DL (ref 6–20)
BUN/CREAT SERPL: 15 (ref 12–20)
CALCIUM SERPL-MCNC: 8 MG/DL (ref 8.5–10.1)
CHLORIDE SERPL-SCNC: 101 MMOL/L (ref 97–108)
CO2 SERPL-SCNC: 26 MMOL/L (ref 21–32)
CREAT SERPL-MCNC: 0.65 MG/DL (ref 0.55–1.02)
ERYTHROCYTE [DISTWIDTH] IN BLOOD BY AUTOMATED COUNT: 12.9 % (ref 11.5–14.5)
GLUCOSE SERPL-MCNC: 125 MG/DL (ref 65–100)
HCT VFR BLD AUTO: 36.7 % (ref 35–47)
HGB BLD-MCNC: 12.2 G/DL (ref 11.5–16)
MCH RBC QN AUTO: 28.9 PG (ref 26–34)
MCHC RBC AUTO-ENTMCNC: 33.2 G/DL (ref 30–36.5)
MCV RBC AUTO: 87 FL (ref 80–99)
PLATELET # BLD AUTO: 285 K/UL (ref 150–400)
POTASSIUM SERPL-SCNC: 3.7 MMOL/L (ref 3.5–5.1)
RBC # BLD AUTO: 4.22 M/UL (ref 3.8–5.2)
SODIUM SERPL-SCNC: 137 MMOL/L (ref 136–145)
WBC # BLD AUTO: 13.2 K/UL (ref 3.6–11)

## 2017-05-02 PROCEDURE — 74011250636 HC RX REV CODE- 250/636: Performed by: PHYSICIAN ASSISTANT

## 2017-05-02 PROCEDURE — 97530 THERAPEUTIC ACTIVITIES: CPT

## 2017-05-02 PROCEDURE — 80048 BASIC METABOLIC PNL TOTAL CA: CPT | Performed by: PHYSICIAN ASSISTANT

## 2017-05-02 PROCEDURE — 36415 COLL VENOUS BLD VENIPUNCTURE: CPT | Performed by: PHYSICIAN ASSISTANT

## 2017-05-02 PROCEDURE — 97165 OT EVAL LOW COMPLEX 30 MIN: CPT

## 2017-05-02 PROCEDURE — 74011250637 HC RX REV CODE- 250/637: Performed by: PHYSICIAN ASSISTANT

## 2017-05-02 PROCEDURE — 97161 PT EVAL LOW COMPLEX 20 MIN: CPT

## 2017-05-02 PROCEDURE — 74011250637 HC RX REV CODE- 250/637: Performed by: NURSE PRACTITIONER

## 2017-05-02 PROCEDURE — 65270000029 HC RM PRIVATE

## 2017-05-02 PROCEDURE — 77010033678 HC OXYGEN DAILY

## 2017-05-02 PROCEDURE — 85027 COMPLETE CBC AUTOMATED: CPT | Performed by: PHYSICIAN ASSISTANT

## 2017-05-02 PROCEDURE — 97535 SELF CARE MNGMENT TRAINING: CPT

## 2017-05-02 PROCEDURE — 97116 GAIT TRAINING THERAPY: CPT

## 2017-05-02 RX ORDER — OXYCODONE HYDROCHLORIDE 5 MG/1
10 TABLET ORAL
Status: DISCONTINUED | OUTPATIENT
Start: 2017-05-02 | End: 2017-05-04

## 2017-05-02 RX ORDER — MORPHINE SULFATE 2 MG/ML
4 INJECTION, SOLUTION INTRAMUSCULAR; INTRAVENOUS
Status: DISCONTINUED | OUTPATIENT
Start: 2017-05-02 | End: 2017-05-04

## 2017-05-02 RX ORDER — OXYCODONE HYDROCHLORIDE 5 MG/1
5 TABLET ORAL
Status: DISCONTINUED | OUTPATIENT
Start: 2017-05-02 | End: 2017-05-04

## 2017-05-02 RX ADMIN — HYDROCHLOROTHIAZIDE 25 MG: 25 TABLET ORAL at 08:23

## 2017-05-02 RX ADMIN — ACETAMINOPHEN 650 MG: 325 TABLET ORAL at 23:26

## 2017-05-02 RX ADMIN — Medication 10 ML: at 14:52

## 2017-05-02 RX ADMIN — OXYCODONE HYDROCHLORIDE AND ACETAMINOPHEN 1 TABLET: 10; 325 TABLET ORAL at 08:22

## 2017-05-02 RX ADMIN — ACETAMINOPHEN 650 MG: 325 TABLET ORAL at 14:43

## 2017-05-02 RX ADMIN — DIAZEPAM 5 MG: 5 TABLET ORAL at 16:24

## 2017-05-02 RX ADMIN — Medication 10 ML: at 06:47

## 2017-05-02 RX ADMIN — ACETAMINOPHEN 325 MG: 325 TABLET ORAL at 18:55

## 2017-05-02 RX ADMIN — OXYCODONE HYDROCHLORIDE 10 MG: 5 TABLET ORAL at 22:33

## 2017-05-02 RX ADMIN — OXYCODONE HYDROCHLORIDE AND ACETAMINOPHEN 1 TABLET: 10; 325 TABLET ORAL at 18:55

## 2017-05-02 RX ADMIN — SODIUM CHLORIDE 125 ML/HR: 900 INJECTION, SOLUTION INTRAVENOUS at 06:46

## 2017-05-02 RX ADMIN — ACETAMINOPHEN 650 MG: 325 TABLET ORAL at 06:46

## 2017-05-02 RX ADMIN — DIAZEPAM 5 MG: 5 TABLET ORAL at 22:11

## 2017-05-02 RX ADMIN — DOCUSATE SODIUM -SENNOSIDES 1 TABLET: 50; 8.6 TABLET, COATED ORAL at 18:55

## 2017-05-02 RX ADMIN — CEFAZOLIN 2 G: 1 INJECTION, POWDER, FOR SOLUTION INTRAMUSCULAR; INTRAVENOUS; PARENTERAL at 08:23

## 2017-05-02 RX ADMIN — DOCUSATE SODIUM -SENNOSIDES 1 TABLET: 50; 8.6 TABLET, COATED ORAL at 08:23

## 2017-05-02 NOTE — PROGRESS NOTES
ORTHOPAEDIC LUMBAR FUSION PROGRESS NOTE    NAME:     Hector Hicks   :       1960   MRN:       [id number]   DATE:      2017    POD:    1 Day Post-Op  S/P:    Procedure(s):  L3-L4 REVISION LAMINECTOMY, FUSION, INSTRUMENTATION, TLIF, BONEGRAFT    SUBJECTIVE:    C/O back pain along surgical incision  No leg pain or numbness  Denies nausea/vomiting, chest pain, headache or shortness of breath  Pain controlled    Recent Labs      17   0340   HGB  12.2   HCT  36.7   NA  137   K  3.7   CL  101   CO2  26   BUN  10   CREA  0.65   GLU  125*     Patient Vitals for the past 12 hrs:   BP Temp Pulse Resp SpO2   17 0317 108/60 98 °F (36.7 °C) 92 18 100 %   17 2351 116/68 97.9 °F (36.6 °C) 96 18 98 %   17 2022 134/81 98.2 °F (36.8 °C) 97 16 99 %       EXAM:  Dressings clean, dry and intact   Positive strength/ROM bilat lower ext.   Neuro intact to sensation  Calves, soft & nontender  BL LEs NVID      PLAN:  D/C PCA, change to PO pain medications  PT/OT, OOB  Advance regular diet      William Husseinma  Orthopaedic Surgery  Physician Assistant to Dr. Jaquan Hampton

## 2017-05-02 NOTE — PROGRESS NOTES
Bedside and Verbal shift change report given to [facility] (oncoming nurse) by Yvonne Ayala RN (offgoing nurse). Report included the following information SBAR, Kardex, Procedure Summary, Intake/Output, MAR, Accordion and Recent Results.

## 2017-05-02 NOTE — PROGRESS NOTES
Problem: Self Care Deficits Care Plan (Adult)  Goal: *Acute Goals and Plan of Care (Insert Text)  Occupational Therapy Goals  Initiated 5/2/2017    1. Patient will perform lower body dressing with modified independence using Reacher, Stocking Aid, Long [employer] and Dressing Stick PRN within 7 days. 2. Patient will perform toilet transfer with modified independence using mos  3. Patient will don/doff back brace at modified independence within 7 days. 4. Patient will verbalize/demonstrate 3/3 back precautions during ADL tasks without cues within 7 days. OCCUPATIONAL THERAPY EVALUATION  Patient: Sunita Burton (88 y.o. female)  Date: 5/2/2017  Primary Diagnosis: LUMBAR STENOSIS  Lumbar stenosis with neurogenic claudication  Lumbar stenosis  Procedure(s) (LRB):  L3-L4 REVISION LAMINECTOMY, FUSION, INSTRUMENTATION, TLIF, BONEGRAFT (N/A) 1 Day Post-Op   Precautions:   Back, Fall      ASSESSMENT :  Based on the objective data described below, the patient presents with admission for L3-L4 north revision and fusion with bonegraft impacting safety and independence with completion of ADLS and functional task mobility. BUE ROM and strength WDL. Patient educated to back precautions and implications for completion of ADLS. Patient educated to log roll for bed mobility, application and management of brace and cold care. Patient completes bed mobility with mod/min assist, transfers to chair with min assist/CGA. Patient will have assist as needed when discharged, will benefit from additional session for training with YAMIL Collins Patient will benefit from skilled intervention to address the above impairments.   Patients rehabilitation potential is considered to be Good  Factors which may influence rehabilitation potential include:   [ x]             None noted  [ ]             Mental ability/status  [ ]             Medical condition  [ ]             Home/family situation and support systems  [ ]             Safety awareness  [ ]             Pain tolerance/management  [ ]             Other:        PLAN :  Recommendations and Planned Interventions:  [x ]               Self Care Training                  [x ]        Therapeutic Activities  [x ]               Functional Mobility Training    [ ]        Cognitive Retraining  [ ]               Therapeutic Exercises           [x ]        Endurance Activities  [ ]               Balance Training                   [ ]        Neuromuscular Re-Education  [ ]               Visual/Perceptual Training     [x ]   Home Safety Training  [x ]               Patient Education                 [x ]        Family Training/Education  [ ]               Other (comment):     Frequency/Duration: Patient will be followed by occupational therapy 5 times a week to address goals. Discharge Recommendations: Home Health  Further Equipment Recommendations for Discharge: none       SUBJECTIVE:   Patient stated I am ready to get up and move.       OBJECTIVE DATA SUMMARY:   HISTORY:   Past Medical History:   Diagnosis Date    Cancer [facility]) 1985     parathyroid ca    Cancer [facility]) 2011     cervix    Chronic pain       back    Essential hypertension      Hyperlipidemia       trying to watch diet.     Hypertension      Ill-defined condition 04/25/2017     obesity- BMI 32.3    Osteoporosis 12/28/2010     Past Surgical History:   Procedure Laterality Date    HX ADENOIDECTOMY        HX GYN         c section x 3    HX GYN   10/2010     uterine ablation    HX HEART CATHETERIZATION   11/17/2011     no blockages    HX HEENT   1985     parathyroid, saliva glands removed    HX HEENT         mastoidectomy    HX ORTHOPAEDIC   1999     lower back surgery    HX ORTHOPAEDIC   2012     C6-C7 ACDF    HX ORTHOPAEDIC   12/2015     C5-C6 ACDF/ remove anterior instrumentation C6-C7    HX OTHER SURGICAL         lipoma from r rib    HX TONSILLECTOMY        HX TUBAL LIGATION   1986    HX UROLOGICAL   5/2012     bladder sling inserted then removed    HX UROLOGICAL   5/2013     bladder sling inserted        Prior Level of Function/Home Situation: I with ADLS and IADLS, 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  Expanded or extensive additional review of patient history:      Home Situation  Home Environment: Private residence  # Steps to Enter: 4  Rails to Enter: Yes  Hand Rails : Right  One/Two Story Residence: Two story, live on 1st floor  # of Interior Steps: 14  Height of Each Step (in): 0 inches  Interior Rails: Left  Lift Chair Available: No  Living Alone: No  Support Systems: Spouse/Significant Other/Partner  Patient Expects to be Discharged to[de-identified] Private residence  Current DME Used/Available at Home: Blood pressure cuff, Raised toilet seat, Cane, straight  Tub or Shower Type: Shower  [X]  Right hand dominant             [ ]  Left hand dominant     EXAMINATION OF PERFORMANCE DEFICITS:  Cognitive/Behavioral Status:  Neurologic State: Alert  Orientation Level: Oriented X4  Cognition: Follows commands  Perception: Appears intact  Perseveration: No perseveration noted  Safety/Judgement: Fall prevention; Insight into deficits     Skin: appears intact UE     Edema: none noted UE     Hearing: Auditory  Auditory Impairment: Hard of hearing, bilateral  Hearing Aids/Status: Does not own     Vision/Perceptual:                      Diplopia: No    Acuity: Within Defined Limits;Able to read clock/calendar on wall without difficulty    Corrective Lenses: Reading glasses     Range of Motion:     AROM: Within functional limits (BUE)                          Strength:     Strength: Within functional limits (BUE)                 Coordination:  Coordination: Within functional limits (BUE)  Fine Motor Skills-Upper: Left Intact; Right Intact    Gross Motor Skills-Upper: Right Intact; Left Intact     Tone & Sensation:     Tone: Normal (BUE)  Sensation: Intact (BUE)                       Balance:  Sitting: Intact; With support  Standing: Intact; With support  Standing - Static: Good  Standing - Dynamic : Fair     Functional Mobility and Transfers for ADLs:  Bed Mobility:  Rolling: Contact guard assistance  Supine to Sit: Minimum assistance  Scooting: Supervision     Transfers:  Sit to Stand: Minimum assistance  Stand to Sit: Minimum assistance  Bed to Chair: Minimum assistance; Additional time  Toilet Transfer :  (NT, plus cath; chair transfer min assist)     ADL Assessment:  Feeding: Setup     Oral Facial Hygiene/Grooming: Setup (in seated position)     Bathing: Moderate assistance     Upper Body Dressing: Moderate assistance     Lower Body Dressing: Maximum assistance                       ADL Intervention and task modifications:    Dressing brace: Patient instructed and demonstrated to don/doff velcro on brace using dominant side, keeping non-dominant side intact. Patient instructed and demonstrated in meantime of being able to stand with back against wall to don/doff brace, to don/doff seated using lap and bed/chair surface to support brace while manipulating. Home safety: Patient instructed on home modifications and safety (raise height of ADL objects, appropriate height of chair surfaces, recliner safety, change of floor surfaces, clear pathways) to increase independence and fall prevention. Patient indicated understanding. Standing: Patient instructed to walk up to sink/counter top/surfaces, step into walker, square off while using objects, slide objects along surfaces, to increase adherence to back precautions and fall prevention. Patient instructed to increase amount of time standing in order to increase independence and tolerance with ADLs. Cognitive Retraining  Safety/Judgement: Fall prevention; Insight into deficits     Functional Measure:  Barthel Index:      Bathin  Bladder: 0  Bowels: 10  Groomin  Dressin  Feeding: 10  Mobility: 0  Stairs: 0  Toilet Use: 5  Transfer (Bed to Chair and Back): 10  Total: 45         Barthel and G-code impairment scale:  Percentage of impairment CH  0% CI  1-19% CJ  20-39% CK  40-59% CL  60-79% CM  80-99% CN  100%   Barthel Score 0-100 100 99-80 79-60 59-40 20-39 1-19    0   Barthel Score 0-20 20 17-19 13-16 9-12 5-8 1-4 0      The Barthel ADL Index: Guidelines  1. The index should be used as a record of what a patient does, not as a record of what a patient could do. 2. The main aim is to establish degree of independence from any help, physical or verbal, however minor and for whatever reason. 3. The need for supervision renders the patient not independent. 4. A patient's performance should be established using the best available evidence. Asking the patient, friends/relatives and nurses are the usual sources, but direct observation and common sense are also important. However direct testing is not needed. 5. Usually the patient's performance over the preceding 24-48 hours is important, but occasionally longer periods will be relevant. 6. Middle categories imply that the patient supplies over 50 per cent of the effort. 7. Use of aids to be independent is allowed. Mara Delgado., Barthel, DAmberW. ([id number]). Functional evaluation: the Barthel Index. [facility] (14)2. MANDIE Dejesus, Bonifacio Luis., Courtney Jurado., [place], [address] [place] [address] (1999). Measuring the change indisability after inpatient rehabilitation; comparison of the responsiveness of the Barthel Index and Functional [place] Measure. Journal of Neurology, Neurosurgery, and Psychiatry, 66(4), 265-517. Benny Arroyo, N.J.A, GUY Nugent, & Milagro Forde, MHAMILTON. (2004.) Assessment of post-stroke quality of life in cost-effectiveness studies: The usefulness of the Barthel Index and the EuroQoL-5D. Quality of Life Research, 13, 178-16         G codes: In compliance with CMSs Claims Based Outcome Reporting, the following G-code set was chosen for this patient based on their primary functional limitation being treated:      The outcome measure chosen to determine the severity of the functional limitation was the Barthel with a score of 45/100 which was correlated with the impairment scale. · Other PT/OT Primary Functional Limitations:               - CURRENT STATUS:    CK - 40%-59% impaired, limited or restricted               - GOAL STATUS:           CJ - 20%-39% impaired, limited or restricted               - D/C STATUS:                       ---------------To be determined---------------      Occupational Therapy Evaluation Charge Determination   History Examination Decision-Making   LOW Complexity : Brief history review  LOW Complexity : 1-3 performance deficits relating to physical, cognitive , or psychosocial skils that result in activity limitations and / or participation restrictions  LOW Complexity : No comorbidities that affect functional and no verbal or physical assistance needed to complete eval tasks       Based on the above components, the patient evaluation is determined to be of the following complexity level: LOW   Pain:  Pain Scale 1: Numeric (0 - 10)  Pain Intensity 1: 0  Pain Location 1: Back  Pain Orientation 1: Lower  Pain Description 1: Aching  Pain Intervention(s) 1: Medication (see MAR)  Activity Tolerance:   Patient completed multiple sit to stand transfers, mobility around room and bathroom with RW  in completion of ADLS with no LOB or break  Please refer to the flowsheet for vital signs taken during this treatment. After treatment:   [X] Patient left in no apparent distress sitting up in chair  [ ] Patient left in no apparent distress in bed  [X] Call bell left within reach  [X] Nursing notified  [ ] Caregiver present  [ ] Bed alarm activated      COMMUNICATION/EDUCATION:   The patients plan of care was discussed with: Physical Therapist, Registered Nurse and . [X] Home safety education was provided and the patient/caregiver indicated understanding.   [X] Patient/family have participated as able in goal setting and plan of care. [X] Patient/family agree to work toward stated goals and plan of care. [ ] Patient understands intent and goals of therapy, but is neutral about his/her participation. [ ] Patient is unable to participate in goal setting and plan of care. This patients plan of care is appropriate for delegation to ISRA.      Thank you for this referral.  Matteo Goldman, OT  Time Calculation: 30 mins

## 2017-05-02 NOTE — PROGRESS NOTES
Problem: Mobility Impaired (Adult and Pediatric)  Goal: *Acute Goals and Plan of Care (Insert Text)  Physical Therapy Goals  Initiated 5/2/2017    1. Patient will move from supine to sit and sit to supine in bed with modified independence within 4 days. 2. Patient will perform sit to stand with modified independence within 4 days. 3. Patient will ambulate with supervision/set-up for 400 feet with the least restrictive device within 4 days. 4. Patient will ascend/descend 4 stairs with 1 handrail(s) with minimal assistance/contact guard assist within 4 days. 5. Patient will verbalize and demonstrate understanding of spinal precautions (No bending, lifting greater than 5 lbs, or twisting; log-roll technique; frequent repositioning as instructed) within 4 days. PHYSICAL THERAPY EVALUATION  Patient: Morgan Reeder (66 y.o. female)  Date: 5/2/2017  Primary Diagnosis: LUMBAR STENOSIS  Lumbar stenosis with neurogenic claudication  Lumbar stenosis  Procedure(s) (LRB):  L3-L4 REVISION LAMINECTOMY, FUSION, INSTRUMENTATION, TLIF, BONEGRAFT (N/A) 1 Day Post-Op   Precautions:   Back, Fall      ASSESSMENT :  Based on the objective data described below, the patient presents with post op day #1 from L3-4 Revised Sanchez/Fusion TLIF. Received supine in bed with hemovac and PCA. Somewhat groggy, yet able to follow commands. Educated with back precautions, brace application and log roll. Mod to Min A with supine to sit. VSS allowing sit to stand and gait of 40' with RW and Min to [place] [address]. VSS in chair with call bell in NAD. Will benefit from continued acute PT to progress gait, strength, activity tolerance, transfers and balance. Will need RW ordered via CM. Orders placed. Patient will benefit from skilled intervention to address the above impairments.   Patients rehabilitation potential is considered to be Good  Factors which may influence rehabilitation potential include:   [X]         None noted  [ ]         Mental ability/status  [ ]         Medical condition  [ ]         Home/family situation and support systems  [ ]         Safety awareness  [ ]         Pain tolerance/management  [ ]         Other:        PLAN :  Recommendations and Planned Interventions:  [X]           Bed Mobility Training             [X]    Neuromuscular Re-Education  [X]           Transfer Training                   [ ]    Orthotic/Prosthetic Training  [X]           Gait Training                         [ ]    Modalities  [X]           Therapeutic Exercises           [ ]    Edema Management/Control  [ ]           Therapeutic Activities            [X]    Patient and Family Training/Education  [ ]           Other (comment):     Frequency/Duration: Patient will be followed by physical therapy  twice daily to address goals. Discharge Recommendations: Home Health  Further Equipment Recommendations for Discharge: rolling walker       SUBJECTIVE:   Patient stated I am doing ok.       OBJECTIVE DATA SUMMARY:   HISTORY:    Past Medical History:   Diagnosis Date    Cancer [facility]) 1985     parathyroid ca    Cancer [facility]) 2011     cervix    Chronic pain       back    Essential hypertension      Hyperlipidemia       trying to watch diet.     Hypertension      Ill-defined condition 04/25/2017     obesity- BMI 32.3    Osteoporosis 12/28/2010     Past Surgical History:   Procedure Laterality Date    HX ADENOIDECTOMY        HX GYN         c section x 3    HX GYN   10/2010     uterine ablation    HX HEART CATHETERIZATION   11/17/2011     no blockages    HX HEENT   1985     parathyroid, saliva glands removed    HX HEENT         mastoidectomy    HX ORTHOPAEDIC   1999     lower back surgery    HX ORTHOPAEDIC   2012     C6-C7 ACDF    HX ORTHOPAEDIC   12/2015     C5-C6 ACDF/ remove anterior instrumentation C6-C7    HX OTHER SURGICAL         lipoma from r rib    HX TONSILLECTOMY        HX TUBAL LIGATION   1986    HX UROLOGICAL   5/2012     bladder sling inserted then removed    HX UROLOGICAL   5/2013     bladder sling inserted     Prior Level of Function/Home Situation: lives with spouse; independent  Personal factors and/or comorbidities impacting plan of care: po day 1 from L3-4 revised north/fusion     Home Situation  Home Environment: Private residence  # Steps to Enter: 4  Rails to Enter: Yes  Hand Rails : Right  One/Two Story Residence: Two story, live on 1st floor  # of Interior Steps: 14  Height of Each Step (in): 0 inches  Interior Rails: Left  Lift Chair Available: No  Living Alone: No  Support Systems: Spouse/Significant Other/Partner  Patient Expects to be Discharged to[de-identified] Private residence  Current DME Used/Available at Home: Blood pressure cuff, Raised toilet seat, Cane, straight  Tub or Shower Type: Shower     EXAMINATION/PRESENTATION/DECISION MAKING:   Critical Behavior:  Neurologic State: Alert  Orientation Level: Oriented X4  Cognition: Follows commands  Safety/Judgement: Fall prevention, Insight into deficits  Hearing: Auditory  Auditory Impairment: Hard of hearing, bilateral  Hearing Aids/Status: Does not own  Skin:  IV; PCA; hemovac; back dressing; alford  Edema: normal  Range Of Motion:  AROM: Generally decreased, functional                       Strength:    Strength: Generally decreased, functional                    Tone & Sensation:   Tone: Normal              Sensation: Intact               Coordination:  Coordination: Within functional limits  Vision:      Functional Mobility:  Bed Mobility:  Rolling: Contact guard assistance  Supine to Sit: Minimum assistance     Scooting: Supervision  Transfers:  Sit to Stand: Minimum assistance  Stand to Sit: Minimum assistance                       Balance:   Sitting: Intact  Standing: Impaired; With support  Standing - Static: Good;Constant support  Standing - Dynamic : Fair  Ambulation/Gait Training:  Distance (ft): 40 Feet (ft)  Assistive Device: Walker, rolling;Gait belt;Brace/Splint  Ambulation - Level of Assistance: Minimal assistance;Contact guard assistance        Gait Abnormalities: Decreased step clearance              Speed/[place]: Slow;Pace decreased (<100 feet/min)  Step Length: Right shortened;Left shortened                                                     Therapeutic Exercises: Ankle pumps; spirometry                        Functional Measure:  Barthel Index:      Bathin  Bladder: 0  Bowels: 10  Groomin  Dressin  Feeding: 10  Mobility: 0  Stairs: 0  Toilet Use: 5  Transfer (Bed to Chair and Back): 10  Total: 45         Barthel and G-code impairment scale:  Percentage of impairment CH  0% CI  1-19% CJ  20-39% CK  40-59% CL  60-79% CM  80-99% CN  100%   Barthel Score 0-100 100 99-80 79-60 59-40 20-39 1-19    0   Barthel Score 0-20 20 17-19 13-16 9-12 5-8 1-4 0      The Barthel ADL Index: Guidelines  1. The index should be used as a record of what a patient does, not as a record of what a patient could do. 2. The main aim is to establish degree of independence from any help, physical or verbal, however minor and for whatever reason. 3. The need for supervision renders the patient not independent. 4. A patient's performance should be established using the best available evidence. Asking the patient, friends/relatives and nurses are the usual sources, but direct observation and common sense are also important. However direct testing is not needed. 5. Usually the patient's performance over the preceding 24-48 hours is important, but occasionally longer periods will be relevant. 6. Middle categories imply that the patient supplies over 50 per cent of the effort. 7. Use of aids to be independent is allowed. Edmundo Spangler., Barthel, D.W. ([id number]). Functional evaluation: the Barthel Index. [address] [facility] (14)2. Silvia Burns, JIMENAF, Michael Cloud., Jd Mccollum., [place], [address] ().  Measuring the change indisability after inpatient rehabilitation; comparison of the responsiveness of the Barthel Index and Functional [place] Measure. Journal of Neurology, Neurosurgery, and Psychiatry, 66(4), 303-818. YASHIRA Jeong, GUY Nugent, & Christopher Estevez M.A. (2004.) Assessment of post-stroke quality of life in cost-effectiveness studies: The usefulness of the Barthel Index and the EuroQoL-5D. Quality of Life Research, 13, 329-09         G codes: In compliance with CMSs Claims Based Outcome Reporting, the following G-code set was chosen for this patient based on their primary functional limitation being treated: The outcome measure chosen to determine the severity of the functional limitation was the barthel with a score of 45/100 which was correlated with the impairment scale. · Mobility - Walking and Moving Around:               - CURRENT STATUS:    CK - 40%-59% impaired, limited or restricted               - GOAL STATUS:           CJ - 20%-39% impaired, limited or restricted               - D/C STATUS:                       ---------------To be determined---------------      Physical Therapy Evaluation Charge Determination   History Examination Presentation Decision-Making   LOW Complexity : Zero comorbidities / personal factors that will impact the outcome / POC LOW Complexity : 1-2 Standardized tests and measures addressing body structure, function, activity limitation and / or participation in recreation  LOW Complexity : Stable, uncomplicated  Other outcome measures barthel  LOW       Based on the above components, the patient evaluation is determined to be of the following complexity level: LOW      Pain:  Pain Scale 1: Numeric (0 - 10)  Pain Intensity 1: 0  Pain Location 1: Back  Pain Orientation 1: Lower  Pain Description 1: Aching  Pain Intervention(s) 1: Medication (see MAR)  Activity Tolerance:   good  Please refer to the flowsheet for vital signs taken during this treatment.   After treatment:   [X]         Patient left in no apparent distress sitting up in chair  [ ]         Patient left in no apparent distress in bed  [X]         Call bell left within reach  [X]         Nursing notified  [ ]         Caregiver present  [ ]         Bed alarm activated      COMMUNICATION/EDUCATION:   The patients plan of care was discussed with: Occupational Therapist and Registered Nurse.  [X]         Fall prevention education was provided and the patient/caregiver indicated understanding. [X]         Patient/family have participated as able in goal setting and plan of care. [X]         Patient/family agree to work toward stated goals and plan of care. [ ]         Patient understands intent and goals of therapy, but is neutral about his/her participation. [ ]         Patient is unable to participate in goal setting and plan of care.      Thank you for this referral.  Johnathan Truong, PT   Time Calculation: 28 mins

## 2017-05-02 NOTE — INTERDISCIPLINARY ROUNDS
[address]    Patient Information    Name: Kimberlee Hicks  Age: 61 y.o. Admission Date: 5/1/2017  Surgery/Procedure: Procedure(s):  L3-L4 REVISION LAMINECTOMY, FUSION, INSTRUMENTATION, TLIF, BONEGRAFT  Attending Provider: Lynette Kuo MD  Surgeon: Tonia Ruiz   Problem List: Active Problems:    Lumbar stenosis with neurogenic claudication (5/1/2017)      Lumbar stenosis (5/1/2017)      During rounds the following quality care indicators and evidence based practices were addressed :       PT/OT: Patient mobility - PT in room now                       Pain Assessment  Pain Intensity 1: 3 (05/02/17 0900)  Pain Location 1: Back  Pain Intervention(s) 1: Medication (see MAR)  Patient Stated Pain Goal: 3    Pain meds: Dilaudid PCA, percocet  Antibiotics completed: Yes  Anticoagulation:  none    SCDs: in place  Bowels:     RRAT Score:      Readmit Risk Tool  Support Systems: Spouse/Significant Other/Partner  Relationship with Primary Physician Group: No primary care provider    Discharge Management/Planning:    Readmit Risk Assessment Information:   Low Risk            6       Total Score        2 Patient Living Status    4 More than 1 Admission in calendar year        Criteria that do not apply:    Relationship with PCP    Patient Length of Stay > 5    Patient Insurance is Medicare, Medicaid or Self Pay    Charlson Comorbidity Score         Readmit Risk Tool  Support Systems: Spouse/Significant Other/Partner  Relationship with Primary Physician Group: No primary care provider    [facility]:     Anticipated Date of Discharge: Thurs    Interdisciplinary team rounds were held with the following team members: Nurse Practitioner, Case Management, Nursing, Pharmacy, and Rehab. See clinical pathway and/or care plan for interventions and desired outcomes.

## 2017-05-02 NOTE — PROGRESS NOTES
Problem: Mobility Impaired (Adult and Pediatric)  Goal: *Acute Goals and Plan of Care (Insert Text)  Physical Therapy Goals  Initiated 5/2/2017    1. Patient will move from supine to sit and sit to supine in bed with modified independence within 4 days. 2. Patient will perform sit to stand with modified independence within 4 days. 3. Patient will ambulate with supervision/set-up for 400 feet with the least restrictive device within 4 days. 4. Patient will ascend/descend 4 stairs with 1 handrail(s) with minimal assistance/contact guard assist within 4 days. 5. Patient will verbalize and demonstrate understanding of spinal precautions (No bending, lifting greater than 5 lbs, or twisting; log-roll technique; frequent repositioning as instructed) within 4 days. PHYSICAL THERAPY TREATMENT  Patient: Luis Beasley (84 y.o. female)  Date: 5/2/2017  Precautions: Back, Fall      ASSESSMENT:  Pt received supine in bed stating alford catheter pulled thus requesting to go to the bathroom. RN alerted of successful evacuation of bladder. Supine to sit with Mod A.  Verbal cues for proper hand placement from sidelie. Sit to stand with Min to CGA, then CGA for 150' gait with RW. Good tolerance. Placed in chair with call bell. Progression toward goals:  [X]      Improving appropriately and progressing toward goals  [ ]      Improving slowly and progressing toward goals  [ ]      Not making progress toward goals and plan of care will be adjusted       PLAN:  Patient continues to benefit from skilled intervention to address the above impairments. Continue treatment per established plan of care. Discharge Recommendations:  Home Health  Further Equipment Recommendations for Discharge:  rolling walker ordered       SUBJECTIVE:   Patient stated I am doing better.    The patient stated 3/3 back precautions. Reviewed all 3 with patient.       OBJECTIVE DATA SUMMARY:   Functional Mobility Training:  Bed Mobility:  Log Rolling: Contact guard assistance  Supine to Sit: Minimum assistance     Scooting: Supervision        Brace donned with  supervision/set-up   Transfers:  Sit to Stand: Minimum assistance  Stand to Sit: Minimum assistance        Bed to Chair: Minimum assistance; Additional time                    Ambulation/Gait Training:  Distance (ft): 150 Feet (ft)  Assistive Device: Walker, rolling;Gait belt;Brace/Splint  Ambulation - Level of Assistance: Contact guard assistance        Gait Abnormalities: Decreased step clearance              Speed/[place]: Slow;Pace decreased (<100 feet/min)  Step Length: Right shortened;Left shortened                             Pain:  Pain Scale 1: Numeric (0 - 10)  Pain Intensity 1: 10  Pain Location 1: Back  Pain Orientation 1: Lower  Pain Description 1: Aching  Pain Intervention(s) 1: Medication (see MAR)  Activity Tolerance:   good  Please refer to the flowsheet for vital signs taken during this treatment.   After treatment:   [X]  Patient left in no apparent distress sitting up in chair  [ ]  Patient left in no apparent distress in bed  [X]  Call bell left within reach  [X]  Nursing notified  [ ]  Caregiver present  [ ]  Bed alarm activated      COMMUNICATION/COLLABORATION:   The patients plan of care was discussed with: Registered Nurse     Tj Che, PT   Time Calculation: 30 mins

## 2017-05-02 NOTE — PROGRESS NOTES
5/2/17  CM notes consult for home health and attempted to speak with pt re these services and to complete initial assessment. Pt was sleeping soundly so CM will follow up to complete assessment later. Pt however has been [facility] and CM sent orders via ecin. CM sent referral for walker to [facility].  CM will continue to follow and assist. Jaylyn Block LCSW

## 2017-05-03 LAB
ANION GAP BLD CALC-SCNC: 8 MMOL/L (ref 5–15)
BUN SERPL-MCNC: 10 MG/DL (ref 6–20)
BUN/CREAT SERPL: 14 (ref 12–20)
CALCIUM SERPL-MCNC: 8 MG/DL (ref 8.5–10.1)
CHLORIDE SERPL-SCNC: 105 MMOL/L (ref 97–108)
CO2 SERPL-SCNC: 29 MMOL/L (ref 21–32)
CREAT SERPL-MCNC: 0.72 MG/DL (ref 0.55–1.02)
ERYTHROCYTE [DISTWIDTH] IN BLOOD BY AUTOMATED COUNT: 13.3 % (ref 11.5–14.5)
GLUCOSE SERPL-MCNC: 91 MG/DL (ref 65–100)
HCT VFR BLD AUTO: 35.5 % (ref 35–47)
HGB BLD-MCNC: 11.5 G/DL (ref 11.5–16)
MCH RBC QN AUTO: 28.7 PG (ref 26–34)
MCHC RBC AUTO-ENTMCNC: 32.4 G/DL (ref 30–36.5)
MCV RBC AUTO: 88.5 FL (ref 80–99)
PLATELET # BLD AUTO: 208 K/UL (ref 150–400)
POTASSIUM SERPL-SCNC: 3.1 MMOL/L (ref 3.5–5.1)
RBC # BLD AUTO: 4.01 M/UL (ref 3.8–5.2)
SODIUM SERPL-SCNC: 142 MMOL/L (ref 136–145)
WBC # BLD AUTO: 8.1 K/UL (ref 3.6–11)

## 2017-05-03 PROCEDURE — 74011250637 HC RX REV CODE- 250/637: Performed by: PHYSICIAN ASSISTANT

## 2017-05-03 PROCEDURE — 85027 COMPLETE CBC AUTOMATED: CPT | Performed by: PHYSICIAN ASSISTANT

## 2017-05-03 PROCEDURE — 74011250637 HC RX REV CODE- 250/637: Performed by: NURSE PRACTITIONER

## 2017-05-03 PROCEDURE — 97116 GAIT TRAINING THERAPY: CPT

## 2017-05-03 PROCEDURE — 36415 COLL VENOUS BLD VENIPUNCTURE: CPT | Performed by: PHYSICIAN ASSISTANT

## 2017-05-03 PROCEDURE — 80048 BASIC METABOLIC PNL TOTAL CA: CPT | Performed by: PHYSICIAN ASSISTANT

## 2017-05-03 PROCEDURE — 97530 THERAPEUTIC ACTIVITIES: CPT

## 2017-05-03 PROCEDURE — 65270000029 HC RM PRIVATE

## 2017-05-03 RX ORDER — ONDANSETRON 4 MG/1
4 TABLET, ORALLY DISINTEGRATING ORAL
Status: DISCONTINUED | OUTPATIENT
Start: 2017-05-03 | End: 2017-05-04 | Stop reason: HOSPADM

## 2017-05-03 RX ORDER — POTASSIUM CHLORIDE 750 MG/1
40 TABLET, FILM COATED, EXTENDED RELEASE ORAL
Status: COMPLETED | OUTPATIENT
Start: 2017-05-03 | End: 2017-05-03

## 2017-05-03 RX ADMIN — ACETAMINOPHEN 650 MG: 325 TABLET ORAL at 23:42

## 2017-05-03 RX ADMIN — OXYCODONE HYDROCHLORIDE 10 MG: 5 TABLET ORAL at 17:20

## 2017-05-03 RX ADMIN — DOCUSATE SODIUM -SENNOSIDES 1 TABLET: 50; 8.6 TABLET, COATED ORAL at 08:29

## 2017-05-03 RX ADMIN — ONDANSETRON 4 MG: 4 TABLET, ORALLY DISINTEGRATING ORAL at 10:56

## 2017-05-03 RX ADMIN — DOCUSATE SODIUM -SENNOSIDES 1 TABLET: 50; 8.6 TABLET, COATED ORAL at 17:20

## 2017-05-03 RX ADMIN — OXYCODONE HYDROCHLORIDE 10 MG: 5 TABLET ORAL at 10:25

## 2017-05-03 RX ADMIN — OXYCODONE HYDROCHLORIDE 10 MG: 5 TABLET ORAL at 20:30

## 2017-05-03 RX ADMIN — DIAZEPAM 5 MG: 5 TABLET ORAL at 21:41

## 2017-05-03 RX ADMIN — ONDANSETRON 4 MG: 4 TABLET, ORALLY DISINTEGRATING ORAL at 20:14

## 2017-05-03 RX ADMIN — ACETAMINOPHEN 650 MG: 325 TABLET ORAL at 12:17

## 2017-05-03 RX ADMIN — OXYCODONE HYDROCHLORIDE 10 MG: 5 TABLET ORAL at 23:42

## 2017-05-03 RX ADMIN — OXYCODONE HYDROCHLORIDE 10 MG: 5 TABLET ORAL at 06:54

## 2017-05-03 RX ADMIN — OXYCODONE HYDROCHLORIDE 10 MG: 5 TABLET ORAL at 13:42

## 2017-05-03 RX ADMIN — ACETAMINOPHEN 650 MG: 325 TABLET ORAL at 17:20

## 2017-05-03 RX ADMIN — POTASSIUM CHLORIDE 40 MEQ: 750 TABLET, FILM COATED, EXTENDED RELEASE ORAL at 08:29

## 2017-05-03 RX ADMIN — ACETAMINOPHEN 650 MG: 325 TABLET ORAL at 06:54

## 2017-05-03 RX ADMIN — OXYCODONE HYDROCHLORIDE 10 MG: 5 TABLET ORAL at 03:38

## 2017-05-03 NOTE — PROGRESS NOTES
Problem: Self Care Deficits Care Plan (Adult)  Goal: *Acute Goals and Plan of Care (Insert Text)  Occupational Therapy Goals  Initiated 5/2/2017    1. Patient will perform lower body dressing with modified independence using Reacher, Stocking Aid, Long [employer] and Dressing Stick PRN within 7 days. 2. Patient will perform toilet transfer with modified independence using mos  3. Patient will don/doff back brace at modified independence within 7 days. 4. Patient will verbalize/demonstrate 3/3 back precautions during ADL tasks without cues within 7 days. OCCUPATIONAL THERAPY TREATMENT  Patient: Kajal Mojica (16 y.o. female)  Date: 5/3/2017  Diagnosis: LUMBAR STENOSIS  Lumbar stenosis with neurogenic claudication  Lumbar stenosis <principal problem not specified>  Procedure(s) (LRB):  L3-L4 REVISION LAMINECTOMY, FUSION, INSTRUMENTATION, TLIF, BONEGRAFT (N/A) 2 Days Post-Op  Precautions: Back, Fall No bending, no lifting greater than 5 lbs, no twisting, log-roll technique, repositioning every 20-30 min except when sleeping,  brace when OOB      ASSESSMENT:  Patient recalls 3/3 back precautions, indicates understanding through demonstration during functional task mobility and completion of ADLS. Patient educated in use of AE, demonstrates understanding for completion of LE ADLS. Patient completes <> bathroom , <> commode with SBA. Patient manages brace SBA, returns to supine with min assist for LE management with log roll. Patient will benefit from [facility] at discharge. Progression toward goals:  [X]          Improving appropriately and progressing toward goals  [ ]          Improving slowly and progressing toward goals  [ ]          Not making progress toward goals and plan of care will be adjusted       PLAN:  Patient continues to benefit from skilled intervention to address the above impairments. Continue treatment per established plan of care.   Discharge Recommendations:  Home Health  Further Equipment Recommendations for Discharge:  none       SUBJECTIVE:   Patient stated I am hoping to go home tomorrow.   The patient stated 3/3 back precautions. Reviewed all 3 with patient. OBJECTIVE DATA SUMMARY:   Cognitive/Behavioral Status:  Neurologic State: Alert  Orientation Level: Oriented X4  Cognition: Follows commands  Safety/Judgement: Fall prevention, Insight into deficits     Functional Mobility and Transfers for ADLs:  Bed Mobility:  Rolling: Contact guard assistance  Supine to Sit: Minimum assistance  Sit to Supine: Minimum assistance (with log roll technique)  Scooting: Supervision     Transfers:  Sit to Stand: Stand-by asssistance  Functional Transfers  Bathroom Mobility: Stand-by assistance  Toilet Transfer : Supervision     Balance:  Sitting: Intact  Standing: Intact; With support  Standing - Static: Good  Standing - Dynamic : Good     ADL Intervention and Instruction:  Feeding  Feeding Assistance: Supervision/set-up     Grooming  Grooming Assistance: Supervision/set up (in standing at sink)  Washing Face: Supervision/set-up  Washing Hands: Supervision/set-up  Brushing Teeth: Supervision/set-up     Upper Body Bathing  Bathing Assistance: Minimum assistance  Position Performed: Seated edge of bed  Cues: Physical assistance           Upper Body Dressing Assistance  Dressing Assistance: Stand-by assistance  Orthotics(Brace): Supervision/set-up  Front Opened Shirt: Supervision/set-up     Lower Body Dressing Assistance  Dressing Assistance: Modified independent  Socks: Modified independent  Leg Crossed Method Used: No  Position Performed: Seated in chair  Cues: Don  Adaptive Equipment Used: Dressing stick; Long handled shoe horn;Reacher;Sock aid; Walker     Toileting  Toileting Assistance: Stand-by assistance  Bladder Hygiene: Stand-by assistance  Clothing Management: Stand-by assistance  Cues: Tactile cues provided;Verbal cues provided  Adaptive Equipment: Elevated seat;Walker        Bathing: Patient instructed and indicated understanding when bathing to not submerge wound in water, stand to shower or sponge bathe, cover wound with plastic and tape to ensure no water reaches bandage/wound without cues. Dressing brace: Patient instructed and demonstrated to don/doff velcro on brace using dominant side, keeping non-dominant side intact. Patient instructed and demonstrated in meantime of being able to stand with back against wall to don/doff brace, to don/doff seated using lap and bed/chair surface to support brace while manipulating. Dressing lower body: Patient instructed to don brace first and on the benefits to remain seated to don all clothing to increase independence with precautions and pain management. Toileting: Patient instructed on the benefits of using flushable wet wipes and toilet tongs if decreased reach or pain for carmen care. Also, the benefits of a reacher to aid in clothing management. Home safety: Patient instructed and indicated understanding on home modifications and safety (raise height of ADL objects, appropriate height of chair surfaces, recliner safety, change of floor surfaces, clear pathways) to increase independence and fall prevention with RW. Standing: Patient instructed and indicated understanding to walk up to sink/counter top/surfaces, step into walker, square off while using objects, slide objects along surfaces, to increase adherence to back precautions and fall prevention. Patient instructed to increase amount of time standing in order to increase independence and tolerance with ADLs. During prolonged standing, can open cabinet door or place foot on stool to decrease spinal pressure/increase pain. Tub transfer: Patient instructed and indicated understanding regarding when it is safe to begin transfer into tub (complete stairs with PT, advance exercises with PT high enough to clear tub height, and while clothes donned practice with another person present).   Patient instructed and indicated understanding to use the same technique as used with stairs when entering and exiting tub (\"up with good leg, down with bad leg\"). Patient instructed and indicated understanding the benefits of maintaining activity tolerance, functional mobility, and independence with self care tasks during acute stay  to ensure safe return home and to baseline. Encouraged patient to increase frequency and duration OOB, not sitting longer than 30 mins without marching/walking with staff, be out of bed for all meals, perform daily ADLs (as approved by RN/MD regarding bathing etc), and performing functional mobility to/from bathroom. Patient instruction and indicated understanding on body mechanics, ergonomics and gravitational force on the spine during different body positions to plan activities in prep for return home to complete instrumental ADLs and back to work safely. Pain:  Pain Scale 1: Numeric (0 - 10)  Pain Intensity 1: 5  Pain Location 1: Back  Pain Orientation 1: Posterior  Pain Description 1: Aching  Pain Intervention(s) 1: Medication (see MAR)  Activity Tolerance:   Patient completed multiple sit to stand transfers, mobility around room and bathroom with RW  in completion of ADLS with no LOB or break  Please refer to the flowsheet for vital signs taken during this treatment.   After treatment:   [ ] Patient left in no apparent distress sitting up in chair  [X] Patient left in no apparent distress in bed  [X] Call bell left within reach  [X] Nursing notified  [ ] Caregiver present  [ ] Bed alarm activated      COMMUNICATION/COLLABORATION:   The patients plan of care was discussed with: Physical Therapist, Registered Nurse and      Maranda Valentine OT  Time Calculation: 45 mins

## 2017-05-03 NOTE — PROGRESS NOTES
ORTHOPAEDIC LUMBAR FUSION PROGRESS NOTE    NAME:     Sammie Vega   :       1960   MRN:       [id number]   DATE:      5/3/2017    POD:    2 Days Post-Op  S/P:    Procedure(s):  L3-L4 REVISION LAMINECTOMY, FUSION, INSTRUMENTATION, TLIF, BONEGRAFT    SUBJECTIVE:    C/O back pain along surgical incision  No leg pain or numbness  Denies nausea/vomiting, chest pain, headache or shortness of breath  Pain controlled    Recent Labs      17   0419   HGB  11.5   HCT  35.5   NA  142   K  3.1*   CL  105   CO2  29   BUN  10   CREA  0.72   GLU  91     Patient Vitals for the past 12 hrs:   BP Temp Pulse Resp SpO2   17 0825 118/70 98.5 °F (36.9 °C) 85 - 94 %   17 0343 98/52 - 90 18 95 %   17 2334 121/57 - 95 18 97 %       EXAM:  Dressings clean, dry and intact   Positive strength/ROM bilat lower ext.   Neuro intact to sensation  Calves, soft & nontender  BL LEs NVID      PLAN:  Continue PO pain medications, will change to PO Dilaudid  PT/OT, OOB  Advance regular diet      Kellie Winkler [place]  Orthopaedic Surgery  Physician Assistant to Dr. Ari Ortiz

## 2017-05-03 NOTE — PROGRESS NOTES
5/3/2017 10:11 AM Met with pt. Charted address and phone numbers confirmed. Pt will have her daughter and sister at home with her after discharge to assist. Pt has rx coverage and fills her scripts at [facility]. Pt's daughter or sister will transport pt home. For home health needs, pt selected [facility]. [facility] has accepted pt. For DME needs pt selected [facility]. CM will follow. 5/3/2017 7:47 AM Pt's RW will be delivered by [facility] to pt today prior to discharge.  SESAR GrangerW

## 2017-05-03 NOTE — PROGRESS NOTES
Bedside and Verbal shift change report given to [facility] (oncoming nurse) by Rashaun Awad RN (offgoing nurse). Report included the following information SBAR, Kardex, Procedure Summary, Intake/Output, MAR, Accordion and Recent Results.

## 2017-05-03 NOTE — INTERDISCIPLINARY ROUNDS
[address]    Patient Information    Name: Santy Hendrix  Age: 61 y.o. Admission Date: 5/1/2017  Surgery/Procedure: Procedure(s):  L3-L4 REVISION LAMINECTOMY, FUSION, INSTRUMENTATION, TLIF, BONEGRAFT  Attending Provider: Mandie Hanson MD  Surgeon: Cecy Cloud   Problem List: Active Problems:    Lumbar stenosis with neurogenic claudication (5/1/2017)      Lumbar stenosis (5/1/2017)      During rounds the following quality care indicators and evidence based practices were addressed :       PT/OT: Patient mobility - 150', needing some verbal cues for sequencing  Bed Mobility Training  Rolling: Contact guard assistance  Supine to Sit: Minimum assistance  Scooting: Supervision  Transfer Training  Sit to Stand: Minimum assistance  Stand to Sit: Minimum assistance  Bed to Chair: Minimum assistance, Additional time      Gait Training  Assistive Device: Walker, rolling, Gait belt, Brace/Splint  Ambulation - Level of Assistance: Contact guard assistance  Distance (ft): 150 Feet (ft)          Pain Assessment  Pain Intensity 1: 7 (05/03/17 1056)  Pain Location 1: Back  Pain Intervention(s) 1: Medication (see MAR)  Patient Stated Pain Goal: 3    Pain meds: oxycodone  Antibiotics completed:  Yes  Anticoagulation:  none  Robertson: N   Goals For Today: Progress with PT, pain control    RRAT Score:      Readmit Risk Tool  Support Systems: Spouse/Significant Other/Partner  Relationship with Primary Physician Group: No primary care provider    Discharge Management/Planning:    Readmit Risk Assessment Information:   Low Risk            6       Total Score        2 Patient Living Status    4 More than 1 Admission in calendar year        Criteria that do not apply:    Relationship with PCP    Patient Length of Stay > 5    Patient Insurance is Medicare, Medicaid or Self Pay    Charlson Comorbidity Score         Readmit Risk Tool  Support Systems: Spouse/Significant Other/Partner  Relationship with Primary Physician Group: No primary care provider    [address] [place] [address] Avenue:  Anticipated Date of Discharge: tomorrow/Friday    Interdisciplinary team rounds were held with the following team members: Nurse Practitioner, Case Management, Nursing, Pharmacy, and Rehab. See clinical pathway and/or care plan for interventions and desired outcomes.

## 2017-05-03 NOTE — PROGRESS NOTES
Problem: Mobility Impaired (Adult and Pediatric)  Goal: *Acute Goals and Plan of Care (Insert Text)  Physical Therapy Goals  Initiated 5/2/2017    1. Patient will move from supine to sit and sit to supine in bed with modified independence within 4 days. 2. Patient will perform sit to stand with modified independence within 4 days. 3. Patient will ambulate with supervision/set-up for 400 feet with the least restrictive device within 4 days. 4. Patient will ascend/descend 4 stairs with 1 handrail(s) with minimal assistance/contact guard assist within 4 days. 5. Patient will verbalize and demonstrate understanding of spinal precautions (No bending, lifting greater than 5 lbs, or twisting; log-roll technique; frequent repositioning as instructed) within 4 days. PHYSICAL THERAPY TREATMENT  Patient: Deanna Rose (36 y.o. female)  Date: 5/3/2017  Precautions: Back, Fall      ASSESSMENT:  Pt received supine in bed stating that her pain was a 9/10 and was nauseated. RN notified and attended to pt. Pt willing to participate despite symptoms. Log rolled and still requiring verbal cues for hand placement with supine to sit. Sit to stand with Min A. CGA for gait to bathroom and halls for 150' with RW. Toileted self with set up and SBA. Placed in chair with cold pack to surgical site. Stair management likely in morning. Progression toward goals:  [X]      Improving appropriately and progressing toward goals  [ ]      Improving slowly and progressing toward goals  [ ]      Not making progress toward goals and plan of care will be adjusted       PLAN:  Patient continues to benefit from skilled intervention to address the above impairments. Continue treatment per established plan of care. Discharge Recommendations:  Home Health  Further Equipment Recommendations for Discharge:  rolling walker ordered       SUBJECTIVE:   Patient stated I feel awful.    The patient stated 3/3 back precautions.  Reviewed all 3 with patient. OBJECTIVE DATA SUMMARY:   Functional Mobility Training:  Bed Mobility:  Log Rolling: Contact guard assistance  Supine to Sit: Minimum assistance     Scooting: Supervision        Brace donned with  supervision/set-up   Transfers:  Sit to Stand: Minimum assistance  Stand to Sit: Contact guard assistance                             Ambulation/Gait Training:  Distance (ft): 150 Feet (ft)  Assistive Device: Walker, rolling;Gait belt;Brace/Splint  Ambulation - Level of Assistance: Contact guard assistance                 Base of Support: Narrowed     Speed/[place]: Slow                                      Pain:  Pain Scale 1: Numeric (0 - 10)  Pain Intensity 1: 7  Pain Location 1: Back  Pain Orientation 1: Posterior  Pain Description 1: Aching  Pain Intervention(s) 1: Medication (see MAR)  Activity Tolerance:   good  Please refer to the flowsheet for vital signs taken during this treatment.   After treatment:   [X]  Patient left in no apparent distress sitting up in chair  [ ]  Patient left in no apparent distress in bed  [X]  Call bell left within reach  [X]  Nursing notified  [ ]  Caregiver present  [ ]  Bed alarm activated      COMMUNICATION/COLLABORATION:   The patients plan of care was discussed with: Registered Nurse     Kelsea Wooten, PT   Time Calculation: 30 mins

## 2017-05-03 NOTE — PROGRESS NOTES
Problem: Mobility Impaired (Adult and Pediatric)  Goal: *Acute Goals and Plan of Care (Insert Text)  Physical Therapy Goals  Initiated 5/2/2017    1. Patient will move from supine to sit and sit to supine in bed with modified independence within 4 days. 2. Patient will perform sit to stand with modified independence within 4 days. 3. Patient will ambulate with supervision/set-up for 400 feet with the least restrictive device within 4 days. 4. Patient will ascend/descend 4 stairs with 1 handrail(s) with minimal assistance/contact guard assist within 4 days. 5. Patient will verbalize and demonstrate understanding of spinal precautions (No bending, lifting greater than 5 lbs, or twisting; log-roll technique; frequent repositioning as instructed) within 4 days. PHYSICAL THERAPY TREATMENT  Patient: Marleen Herrmann (89 y.o. female)  Date: 5/3/2017  Precautions: Back, Fall      ASSESSMENT:  Pt received sitting up in chair with brace in place. Sit to stand with Min A. Reminded to not pull feet too far back or scoot to edge of seat too far since increasing fall risk. Gait of 200' tolerated well with RW and CGA. Stair management in morning. Progression toward goals:  [X]      Improving appropriately and progressing toward goals  [ ]      Improving slowly and progressing toward goals  [ ]      Not making progress toward goals and plan of care will be adjusted       PLAN:  Patient continues to benefit from skilled intervention to address the above impairments. Continue treatment per established plan of care. Discharge Recommendations:  Home Health  Further Equipment Recommendations for Discharge:  rolling walker fitted       SUBJECTIVE:   Patient stated I am tired.    The patient stated 3/3 back precautions. Reviewed all 3 with patient.       OBJECTIVE DATA SUMMARY:   Functional Mobility Training:  Bed Mobility:  Log Rolling: Contact guard assistance  Supine to Sit: Minimum assistance     Scooting: Supervision Brace donned with  supervision/set-up   Transfers:  Sit to Stand: Minimum assistance  Stand to Sit: Contact guard assistance                             Ambulation/Gait Training:  Distance (ft): 200 Feet (ft)  Assistive Device: Walker, rolling;Gait belt;Brace/Splint  Ambulation - Level of Assistance: Contact guard assistance                 Base of Support: Narrowed     Speed/[place]: Slow                                Pain:  Pain Scale 1: Numeric (0 - 10)  Pain Intensity 1: 5  Pain Location 1: Back  Pain Orientation 1: Posterior  Pain Description 1: Aching  Pain Intervention(s) 1: Medication (see MAR)  Activity Tolerance:   good  Please refer to the flowsheet for vital signs taken during this treatment.   After treatment:   [ ]  Patient left in no apparent distress sitting up in chair  [X]  Patient left in no apparent distress in bed  [X]  Call bell left within reach  [X]  Nursing notified  [X]  Caregiver present  [ ]  Bed alarm activated      COMMUNICATION/COLLABORATION:   The patients plan of care was discussed with: Occupational Therapist and Registered Nurse     Lorraine Yang PT   Time Calculation: 20 mins

## 2017-05-04 VITALS
DIASTOLIC BLOOD PRESSURE: 73 MMHG | BODY MASS INDEX: 32.3 KG/M2 | SYSTOLIC BLOOD PRESSURE: 132 MMHG | WEIGHT: 182.32 LBS | OXYGEN SATURATION: 99 % | TEMPERATURE: 98.7 F | HEIGHT: 63 IN | RESPIRATION RATE: 18 BRPM | HEART RATE: 90 BPM

## 2017-05-04 LAB
ANION GAP BLD CALC-SCNC: 8 MMOL/L (ref 5–15)
BUN SERPL-MCNC: 9 MG/DL (ref 6–20)
BUN/CREAT SERPL: 14 (ref 12–20)
CALCIUM SERPL-MCNC: 8.4 MG/DL (ref 8.5–10.1)
CHLORIDE SERPL-SCNC: 104 MMOL/L (ref 97–108)
CO2 SERPL-SCNC: 28 MMOL/L (ref 21–32)
CREAT SERPL-MCNC: 0.65 MG/DL (ref 0.55–1.02)
ERYTHROCYTE [DISTWIDTH] IN BLOOD BY AUTOMATED COUNT: 13.6 % (ref 11.5–14.5)
GLUCOSE SERPL-MCNC: 92 MG/DL (ref 65–100)
HCT VFR BLD AUTO: 35.9 % (ref 35–47)
HGB BLD-MCNC: 11.3 G/DL (ref 11.5–16)
MCH RBC QN AUTO: 28.3 PG (ref 26–34)
MCHC RBC AUTO-ENTMCNC: 31.5 G/DL (ref 30–36.5)
MCV RBC AUTO: 89.8 FL (ref 80–99)
PLATELET # BLD AUTO: 269 K/UL (ref 150–400)
POTASSIUM SERPL-SCNC: 3.6 MMOL/L (ref 3.5–5.1)
RBC # BLD AUTO: 4 M/UL (ref 3.8–5.2)
SODIUM SERPL-SCNC: 140 MMOL/L (ref 136–145)
WBC # BLD AUTO: 8.2 K/UL (ref 3.6–11)

## 2017-05-04 PROCEDURE — 85027 COMPLETE CBC AUTOMATED: CPT | Performed by: PHYSICIAN ASSISTANT

## 2017-05-04 PROCEDURE — 74011250637 HC RX REV CODE- 250/637: Performed by: NURSE PRACTITIONER

## 2017-05-04 PROCEDURE — 97116 GAIT TRAINING THERAPY: CPT

## 2017-05-04 PROCEDURE — 74011250637 HC RX REV CODE- 250/637: Performed by: PHYSICIAN ASSISTANT

## 2017-05-04 PROCEDURE — 97530 THERAPEUTIC ACTIVITIES: CPT

## 2017-05-04 PROCEDURE — 97535 SELF CARE MNGMENT TRAINING: CPT

## 2017-05-04 PROCEDURE — 36415 COLL VENOUS BLD VENIPUNCTURE: CPT | Performed by: PHYSICIAN ASSISTANT

## 2017-05-04 PROCEDURE — 80048 BASIC METABOLIC PNL TOTAL CA: CPT | Performed by: PHYSICIAN ASSISTANT

## 2017-05-04 RX ORDER — DOCUSATE SODIUM 100 MG/1
100 CAPSULE, LIQUID FILLED ORAL 2 TIMES DAILY
Qty: 60 CAP | Refills: 2 | Status: SHIPPED
Start: 2017-05-04 | End: 2017-08-02

## 2017-05-04 RX ORDER — HYDROMORPHONE HYDROCHLORIDE 2 MG/1
4 TABLET ORAL
Status: DISCONTINUED | OUTPATIENT
Start: 2017-05-04 | End: 2017-05-04 | Stop reason: HOSPADM

## 2017-05-04 RX ORDER — PROMETHAZINE HYDROCHLORIDE 25 MG/1
25 TABLET ORAL
Qty: 1 TAB | Refills: 0 | Status: ON HOLD
Start: 2017-05-04 | End: 2018-11-27

## 2017-05-04 RX ORDER — HYDROMORPHONE HYDROCHLORIDE 2 MG/1
2-4 TABLET ORAL
Qty: 80 TAB | Refills: 0 | Status: ON HOLD | OUTPATIENT
Start: 2017-05-04 | End: 2018-11-27

## 2017-05-04 RX ORDER — HYDROMORPHONE HYDROCHLORIDE 2 MG/1
2 TABLET ORAL
Status: DISCONTINUED | OUTPATIENT
Start: 2017-05-04 | End: 2017-05-04

## 2017-05-04 RX ORDER — HYDROMORPHONE HYDROCHLORIDE 2 MG/1
2 TABLET ORAL
Status: DISCONTINUED | OUTPATIENT
Start: 2017-05-04 | End: 2017-05-04 | Stop reason: HOSPADM

## 2017-05-04 RX ORDER — HYDROMORPHONE HYDROCHLORIDE 2 MG/1
4 TABLET ORAL
Status: DISCONTINUED | OUTPATIENT
Start: 2017-05-04 | End: 2017-05-04

## 2017-05-04 RX ADMIN — HYDROMORPHONE HYDROCHLORIDE 4 MG: 2 TABLET ORAL at 16:57

## 2017-05-04 RX ADMIN — HYDROMORPHONE HYDROCHLORIDE 4 MG: 2 TABLET ORAL at 09:17

## 2017-05-04 RX ADMIN — OXYCODONE HYDROCHLORIDE 10 MG: 5 TABLET ORAL at 02:36

## 2017-05-04 RX ADMIN — DIAZEPAM 5 MG: 5 TABLET ORAL at 03:51

## 2017-05-04 RX ADMIN — OXYCODONE HYDROCHLORIDE 10 MG: 5 TABLET ORAL at 05:40

## 2017-05-04 RX ADMIN — DOCUSATE SODIUM -SENNOSIDES 1 TABLET: 50; 8.6 TABLET, COATED ORAL at 09:17

## 2017-05-04 RX ADMIN — ACETAMINOPHEN 650 MG: 325 TABLET ORAL at 11:53

## 2017-05-04 RX ADMIN — HYDROMORPHONE HYDROCHLORIDE 4 MG: 2 TABLET ORAL at 11:53

## 2017-05-04 RX ADMIN — ACETAMINOPHEN 650 MG: 325 TABLET ORAL at 05:40

## 2017-05-04 NOTE — PROGRESS NOTES
Nurse handed patient 1 script and a copy of instructions. Other scripts called into pharmacy. Patient aware. Nurse read and explained all instructions and medications.  Verbalized understanding

## 2017-05-04 NOTE — PROGRESS NOTES
5/4/2017 4:00 PM [facility] was sent to discharge clinicals and notified of pt's discharge today.  DARREL Alfaro

## 2017-05-04 NOTE — DISCHARGE INSTRUCTIONS
Lumbar Spinal Surgery Discharge Instructions   Dr. Yanci Cervantes  [phone]    Activity:    Darrell Rdz You are going home a well person, be as active as possible. Your only exercise should be walking. Start with short frequent walks and increase your walking distance each day. Start with walking twice a day for 5 minutes and increase your distance each day 2-3 minutes until you reach 25 minutes twice a day. Limit the amount of time you sit to 20-30 minute intervals. Sitting for prolonged periods of time will be uncomfortable for you following your surgery.  No bending, lifting (of 5lbs or more), twisting, or straining.  Do not drive until your doctor states you may do so. However, you may ride in a car for short distances.  If you are required to wear a brace, you should wear it at all times when you are out of bed. You may remove it when sleeping unless your physician advises you against it.  When you are in the bed, you may lay on your back or on either side. Do not lie on your stomach.  You may resume sexual relations 4-6 weeks after surgery.  Continue to use your incentive spirometer for deep breathing exercises. Driving:   You may not drive or return to work until instructed by your physician. However, you may ride in the car for short periods of time. Brace:   If you have a back brace, you should wear your brace at all times when you are out of bed. Do not wear the brace while in bed or showering.  Remember to always wear a cotton t-shirt underneath your brace.  Do not bend or twist when your brace is off. Diet:   You may resume your regular home diet as tolerated. If your throat is sore, you should eat soft foods for the first couple of days.  Be sure to drink plenty of fluids; it is important to keep yourself hydrated.  Avoid alcoholic beverages and ABSOLUTELY NO tobacco products. Tobacco products will interfere with your healing.  If you continue to use tobacco, you may end up needing another surgery in the future. Dressing: You have on a Prineo dressing. This is a waterproof bacteriostatic dressing that requires no post-operative care. Please do not peel the dressing off, or apply any oil based products, as they may expedite the deterioration of the mesh. The dressing will slowly chip off on its own.  Do not rub or apply lotion or ointments to incision site. Shower:   You may shower 5 days after your surgery.  You may remove your brace during showers.  NO not use tub baths, swimming pools or Jacuzzis. Medications:   Check with your physician before taking any anti-inflammatory medications. (Advil, Aleve, Ibuprofen, Aspirin)   Take your pain medication as directed   Do NOT take additional Tylenol if your prescribed pain medication has acetaminophen in it (Endocet/Percocet, Lortab, Norco)   It is important to have regular bowel movements. Pain medications can be constipating. Stool softeners, warm prune juice, increasing your water intake, and increasing fiber in your diet can help in preventing constipation.  Do NOT take laxatives if at all possible except in severe situations. It can result in a vicious cycle of constipation and diarrhea. Stockings:   You have been given T.E.D. stockings to wear. Continue to wear these for 7 days after your discharge. Put them on in the morning and take them off at night. They are used to increase your circulation and prevent blood clots from forming in your legs. Follow-Up    Please call ASAP to schedule your 1st post-operative appointment. This should be approximately 3 weeks from your surgery date. Notify your physician in you develop any of the following conditions:   Fever above 101 degrees for 24 hours.  Nausea or vomiting.  Severe headache.    Inability to urinate   Loss of bowel or bladder function (sudden onset of incontinence)   Changes in sensation in your extremities (numbness, tingling, loss of color).  Severe pain or pain not relieved by medications.  Redness, swelling, or drainage from your incision.  Persistent pain in the chest.    Pain in the calf of either leg.  Increased weakness (if this is greater than before your surgery). If you have any questions about your dressing contact your Orthopaedic Surgeons office.

## 2017-05-04 NOTE — ROUTINE PROCESS
Bedside and Verbal shift change report given to Thomas Farnsworth RN (oncoming nurse) by Ward Brock RN (offgoing nurse). Report included the following information SBAR, Kardex, OR Summary, Intake/Output and MAR.

## 2017-05-04 NOTE — PROGRESS NOTES
Problem: Self Care Deficits Care Plan (Adult)  Goal: *Acute Goals and Plan of Care (Insert Text)  Occupational Therapy Goals  Initiated 5/2/2017    1. Patient will perform lower body dressing with modified independence using Reacher, Stocking Aid, Long [employer] and Dressing Stick PRN within 7 days. 2. Patient will perform toilet transfer with modified independence using mos  3. Patient will don/doff back brace at modified independence within 7 days. 4. Patient will verbalize/demonstrate 3/3 back precautions during ADL tasks without cues within 7 days. OCCUPATIONAL THERAPY TREATMENT  Patient: Shanda Casey (89 y.o. female)  Date: 5/4/2017  Diagnosis: LUMBAR STENOSIS  Lumbar stenosis with neurogenic claudication  Lumbar stenosis <principal problem not specified>  Procedure(s) (LRB):  L3-L4 REVISION LAMINECTOMY, FUSION, INSTRUMENTATION, TLIF, BONEGRAFT (N/A) 3 Days Post-Op  Precautions: Back, Fall  Chart, occupational therapy assessment, plan of care, and goals were reviewed. ASSESSMENT:  Pt agreeable to OT. She sat up edge of bed with contact guard. Pt verbalized all her spinal precautions. Pt donned underwear with use of reacher with mod I. She verbalized she is familiar with sock aide and dressing stick. She has high commode seats at home having had previous surgeries. Pt cleared from an OT standpoint as she has achieved her goals. Recommend home health. Progression toward goals:  [X]          Improving appropriately and progressing toward goals  [ ]          Improving slowly and progressing toward goals  [ ]          Not making progress toward goals and plan of care will be adjusted       PLAN:  Patient continues to benefit from skilled intervention to address the above impairments. Continue treatment per established plan of care.   Discharge Recommendations: Home health  Further Equipment Recommendations for Discharge: Pt has DME       SUBJECTIVE:   Patient stated Blu Bernal will have help at home.      OBJECTIVE DATA SUMMARY:   Cognitive/Behavioral Status:  Neurologic State: Alert  Orientation Level: Oriented X4  Cognition: Appropriate decision making           Functional Mobility and Transfers for ADLs:              Bed Mobility:  Rolling: Contact guard assistance     Sit to Supine: Contact guard assistance  Scooting: Contact guard assistance                Transfers:  Sit to Stand: Contact guard assistance        Balance:  Sitting: Intact  Standing - Static: Good  Standing - Dynamic : Good  ADL Intervention:     Lower Body Dressing Assistance  Underpants: Modified independent  Leg Crossed Method Used: No  Position Performed: Seated edge of bed  Adaptive Equipment Used: Reacher  Pt verbalized she has high commode seats at home having had previous surgeries. Pain:  Pain Scale 1: Numeric (0 - 10)  Pain Intensity 1: 8  Pain Location 1: Back  Pain Orientation 1: Posterior  Pain Description 1: Aching  Pain Intervention(s) 1: Medication (see MAR)     Activity Tolerance:    No distress or discomfort during OT  Please refer to the flowsheet for vital signs taken during this treatment.   After treatment:   [ ]  Patient left in no apparent distress sitting up in chair  [X]  Patient left in no apparent distress in bed  [X]  Call bell left within reach  [ ]  Nursing notified  [ ]  Caregiver present  [ ]  Bed alarm activated      COMMUNICATION/COLLABORATION:   The patients plan of care was discussed with: Occupational Therapist     JORY Grya/L  Time Calculation: 15 mins

## 2017-05-04 NOTE — PROGRESS NOTES
Problem: Mobility Impaired (Adult and Pediatric)  Goal: *Acute Goals and Plan of Care (Insert Text)  Physical Therapy Goals  Initiated 5/2/2017    1. Patient will move from supine to sit and sit to supine in bed with modified independence within 4 days. 2. Patient will perform sit to stand with modified independence within 4 days. 3. Patient will ambulate with supervision/set-up for 400 feet with the least restrictive device within 4 days. 4. Patient will ascend/descend 4 stairs with 1 handrail(s) with minimal assistance/contact guard assist within 4 days. 5. Patient will verbalize and demonstrate understanding of spinal precautions (No bending, lifting greater than 5 lbs, or twisting; log-roll technique; frequent repositioning as instructed) within 4 days. PHYSICAL THERAPY TREATMENT  Patient: Corbin Auguste (39 y.o. female)  Date: 5/4/2017  Diagnosis: LUMBAR STENOSIS  Lumbar stenosis with neurogenic claudication  Lumbar stenosis <principal problem not specified>  Procedure(s) (LRB):  L3-L4 REVISION LAMINECTOMY, FUSION, INSTRUMENTATION, TLIF, BONEGRAFT (N/A) 3 Days Post-Op  Precautions: Back, Fall      ASSESSMENT:  Pt received in chair,LSO in place, reporting 3/10 pain, agreeable to participate with physical therapy. Sit<>stand using RW for steadying with CGA. Gait training x 200' using RW for steadying with CGA/SBA. Ascend/desecd 6 steps using single handrail on R with CGA. Verbal cues for sequencing one step at a time for safety, pt adapted well. Pt requested to return to bed, sit>supine with CGA, Maintaining back precautions. Pt is cleared for discharge from hospital from PT perspective when medically stable.   Progression toward goals:  [X]      Improving appropriately and progressing toward goals  [ ]      Improving slowly and progressing toward goals  [ ]      Not making progress toward goals and plan of care will be adjusted       PLAN:  Patient continues to benefit from skilled intervention to address the above impairments. Continue treatment per established plan of care. Discharge Recommendations:  Home Health  Further Equipment Recommendations for Discharge:  none       SUBJECTIVE:   Patient stated My pain is finally better.    The patient stated 3/3 back precautions. Reviewed all 3 with patient. OBJECTIVE DATA SUMMARY:   Critical Behavior:  Neurologic State: Alert  Orientation Level: Oriented X4  Cognition: Appropriate decision making, Appropriate for age attention/concentration, Appropriate safety awareness, Follows commands  Safety/Judgement: Fall prevention, Insight into deficits  Functional Mobility Training:  Bed Mobility:  Log Rolling: Contact guard assistance     Sit to Supine: Contact guard assistance  Scooting: Contact guard assistance        Brace donned with  supervision/set-up   Transfers:  Sit to Stand: Contact guard assistance  Stand to Sit: Contact guard assistance                             Balance:  Sitting: Intact  Standing - Static: Good  Standing - Dynamic : Good  Ambulation/Gait Training:  Distance (ft): 200 Feet (ft)  Assistive Device: Walker, rolling;Gait belt;Brace/Splint  Ambulation - Level of Assistance: Stand-by asssistance        Gait Abnormalities: Decreased step clearance        Base of Support: Narrowed     Speed/[place]: Slow                                  Stairs:  Number of Stairs Trained: 6  Stairs - Level of Assistance: Contact guard assistance  Rail Use: Right   Therapeutic Exercises:      Pain:  Pain Scale 1: Numeric (0 - 10)  Pain Intensity 1: 3  Pain Location 1: Back  Pain Orientation 1: Posterior  Pain Description 1: Aching  Pain Intervention(s) 1: Medication (see MAR)  Activity Tolerance:   Good  Please refer to the flowsheet for vital signs taken during this treatment.   After treatment:   [ ]  Patient left in no apparent distress sitting up in chair  [X]  Patient left in no apparent distress in bed  [X]  Call bell left within reach  [X]  Nursing notified  [ ]  Caregiver present  [ ]  Bed alarm activated      COMMUNICATION/COLLABORATION:   The patients plan of care was discussed with: Registered Nurse     Boston Laguerre   Time Calculation: 24 mins

## 2017-05-04 NOTE — PROGRESS NOTES
Bedside and Verbal shift change report given to [facility] (oncoming nurse) by Abbi Villagomez RN (offgoing nurse). Report included the following information SBAR, Kardex, Procedure Summary, Intake/Output, MAR, Accordion and Recent Results.

## 2017-05-04 NOTE — INTERDISCIPLINARY ROUNDS
[address]    Patient Information    Name: Shanda Casey  Age: 61 y.o. Admission Date: 5/1/2017  Surgery/Procedure: Procedure(s):  L3-L4 REVISION LAMINECTOMY, FUSION, INSTRUMENTATION, TLIF, BONEGRAFT  Attending Provider: Tony Gallardo MD  Surgeon: Lisa Patel   Problem List: Active Problems:    Lumbar stenosis with neurogenic claudication (5/1/2017)      Lumbar stenosis (5/1/2017)      During rounds the following quality care indicators and evidence based practices were addressed :       PT/OT: Patient mobility - Patient has been cleared for discharge by physical and occupational therapy. Bed Mobility Training  Rolling: Contact guard assistance  Supine to Sit: Minimum assistance  Sit to Supine: Contact guard assistance  Scooting: Contact guard assistance  Transfer Training  Sit to Stand: Contact guard assistance  Stand to Sit: Contact guard assistance  Bed to Chair: Stand-by asssistance      Gait Training  Assistive Device: Walker, rolling, Gait belt, Brace/Splint  Ambulation - Level of Assistance: Stand-by asssistance  Distance (ft): 200 Feet (ft)  Stairs - Level of Assistance: Contact guard assistance  Number of Stairs Trained: 6  Rail Use: Right           Pain Assessment  Pain Intensity 1: 3 (05/04/17 0919)  Pain Location 1: Back  Pain Intervention(s) 1: Medication (see MAR)  Patient Stated Pain Goal: 3    Pain meds: dilaudid po  Antibiotics completed:  Yes  Anticoagulation:  none    SCDs: in place  Bowels: +flatus, - BM    RRAT Score:      Readmit Risk Tool  Support Systems: Spouse/Significant Other/Partner  Relationship with Primary Physician Group: No primary care provider    Discharge Management/Planning:    Readmit Risk Assessment Information:   Low Risk            6       Total Score        2 Patient Living Status    4 More than 1 Admission in calendar year        Criteria that do not apply:    Relationship with PCP    Patient Length of Stay > 5    Patient Insurance is Medicare, Medicaid or Self Pay    Charlson Comorbidity Score         Readmit Risk Tool  Support Systems: Spouse/Significant Other/Partner  Relationship with Primary Physician Group: No primary care provider    Santiago Nath:     Anticipated Date of Discharge: today/tomorrow    Interdisciplinary team rounds were held with the following team members: Nurse Practitioner, Case Management, Nursing, Pharmacy, and Rehab. See clinical pathway and/or care plan for interventions and desired outcomes.

## 2017-05-07 NOTE — PROGRESS NOTES
ORTHOPAEDIC LUMBAR FUSION PROGRESS NOTE    NAME:     Jared Ortega   :       1960   MRN:       [id number]   DATE:      2017    POD:    3 Days Post-Op  S/P:    Procedure(s):  L3-L4 REVISION LAMINECTOMY, FUSION, INSTRUMENTATION, TLIF, BONEGRAFT    SUBJECTIVE:    C/O back pain along surgical incision  No leg pain or numbness  Denies nausea/vomiting, chest pain, headache or shortness of breath  Pain controlled    Labs reviewed. VSS, patient afebrile      EXAM:  Dressings clean, dry and intact   Positive strength/ROM bilat lower ext.   Neuro intact to sensation  Calves, soft & nontender  BL LEs NVID      PLAN:  Continue PO pain medications  PT/OT, OOB  Advance regular diet      William Oconnorma  Orthopaedic Surgery  Physician Assistant to Dr. Nestor Sanchez

## 2017-05-07 NOTE — DISCHARGE SUMMARY
DISCHARGE SUMMARY     Patient: Deanna Rose                             Medical Record Number: [id number]                : 1960  Age: 61 y.o. Admit Date: 2017  Discharge Date: 2017  Admission Diagnosis: LUMBAR STENOSIS  Lumbar stenosis with neurogenic claudication  Lumbar stenosis  Discharge Diagnosis: LUMBAR STENOSIS  Procedures: Procedure(s):  L3-L4 REVISION LAMINECTOMY, FUSION, INSTRUMENTATION, TLIF, BONEGRAFT  Surgeon: Shelby Squires MD  Co-surgeon:   Assistants:   Anesthesia: General  Complications: None     History of Present Illness:  Deanna Rose is a 61 y.o. female with a history of intractible low back and radiculopathy. Despite conservative management and after clinical and radiographic evaluation, it was determined that she suffered from lumbar stenosis  and lumbar spondylolisthesis and would benefit from Procedure(s):  L3-L4 REVISION LAMINECTOMY, FUSION, INSTRUMENTATION, TLIF, BONEGRAFT, which she consented to undergo after a discussion of the risks, benefits, alternatives, rehab concerns, and potential complications of surgery. Hospital Course:  Deanna Rose tolerated the procedure well. She was transferred  to the recovery room in stable condition. After a brief stay, the patient was then transferred to the Orthopedic floor. On postoperative day #1, the dressing was clean and dry and she was neurovascularly intact. The patient was afebrile and vital signs were stable. Calves were soft and non-tender bilaterally. Deanna Rose made excellent progress with physical therapy and was discharged to Home with Home Health in stable condition on postoperative day 3. She was provided with routine postoperative instructions and advised to follow up in my office in 3 weeks following discharge from the hospital.  She was given prescriptions for medication to control post-operative symptoms.     Discharge Medications:  Discharge Medication List as of 2017  3:23 PM      START taking these medications    Details   HYDROmorphone (DILAUDID) 2 mg tablet Take 1-2 Tabs by mouth every four (4) hours as needed. Max Daily Amount: 24 mg., Print, Disp-80 Tab, R-0      promethazine (PHENERGAN) 25 mg tablet Take 1 Tab by mouth every six (6) hours as needed for Nausea., No Print, Disp-1 Tab, R-0      docusate sodium (COLACE) 100 mg capsule Take 1 Cap by mouth two (2) times a day for 90 days. , No Print, Disp-60 Cap, R-2         CONTINUE these medications which have NOT CHANGED    Details   COLLAGEN Take 1 Tab by mouth daily. , Historical Med      hydrochlorothiazide (HYDRODIURIL) 25 mg tablet Take 25 mg by mouth daily. , Historical Med      fluticasone (FLONASE) 50 mcg/actuation nasal spray 2 Sprays by Both Nostrils route two (2) times daily as needed for Rhinitis., Historical Med      ergocalciferol (ERGOCALCIFEROL) 50,000 unit capsule Take 50,000 Units by mouth every seven (7) days. , Historical Med      cyclobenzaprine (FLEXERIL) 10 mg tablet Take  by mouth three (3) times daily as needed for Muscle Spasm(s). , Historical Med      gabapentin (NEURONTIN) 300 mg capsule Take 300 mg by mouth nightly as needed., Historical Med      hydrocortisone-acetic acid (ACETASOL HC) otic solution Administer 1 Drop into each ear two (2) times daily as needed., Historical Med         STOP taking these medications       oxyCODONE-acetaminophen (PERCOCET) 5-325 mg per tablet Comments:   Reason for Stopping:                 BETTINA Islas  5/4/2017  Orthopaedic Surgery  Physician Assistant to Dr. Tony Gallardo

## 2018-11-27 ENCOUNTER — HOSPITAL ENCOUNTER (OUTPATIENT)
Age: 58
Setting detail: OUTPATIENT SURGERY
Discharge: HOME | End: 2018-11-27
Attending: SURGERY | Admitting: SURGERY
Payer: COMMERCIAL

## 2018-11-27 VITALS
RESPIRATION RATE: 14 BRPM | OXYGEN SATURATION: 100 % | HEART RATE: 82 BPM | DIASTOLIC BLOOD PRESSURE: 69 MMHG | WEIGHT: 180 LBS | BODY MASS INDEX: 30.73 KG/M2 | HEIGHT: 64 IN | SYSTOLIC BLOOD PRESSURE: 115 MMHG | TEMPERATURE: 98.2 F

## 2018-11-27 PROCEDURE — 77030032490 HC SLV COMPR SCD KNE COVD -B: Performed by: SURGERY

## 2018-11-27 PROCEDURE — 88304 TISSUE EXAM BY PATHOLOGIST: CPT

## 2018-11-27 PROCEDURE — 76010000138 HC OR TIME 0.5 TO 1 HR: Performed by: SURGERY

## 2018-11-27 PROCEDURE — 77030018836 HC SOL IRR NACL ICUM -A: Performed by: SURGERY

## 2018-11-27 PROCEDURE — 77030002933 HC SUT MCRYL J&J -A: Performed by: SURGERY

## 2018-11-27 PROCEDURE — 77030039266 HC ADH SKN EXOFIN S2SG -A: Performed by: SURGERY

## 2018-11-27 PROCEDURE — 74011000250 HC RX REV CODE- 250: Performed by: SURGERY

## 2018-11-27 PROCEDURE — 77030011283 HC ELECTRD NDL COVD -A: Performed by: SURGERY

## 2018-11-27 PROCEDURE — 76210000020 HC REC RM PH II FIRST 0.5 HR: Performed by: SURGERY

## 2018-11-27 RX ORDER — LIDOCAINE HYDROCHLORIDE 10 MG/ML
0.1 INJECTION, SOLUTION EPIDURAL; INFILTRATION; INTRACAUDAL; PERINEURAL AS NEEDED
Status: DISCONTINUED | OUTPATIENT
Start: 2018-11-27 | End: 2018-11-27 | Stop reason: HOSPADM

## 2018-11-27 RX ORDER — HYDROMORPHONE HYDROCHLORIDE 2 MG/ML
.5-1 INJECTION, SOLUTION INTRAMUSCULAR; INTRAVENOUS; SUBCUTANEOUS
Status: DISCONTINUED | OUTPATIENT
Start: 2018-11-27 | End: 2018-11-27

## 2018-11-27 RX ORDER — SODIUM CHLORIDE, SODIUM LACTATE, POTASSIUM CHLORIDE, CALCIUM CHLORIDE 600; 310; 30; 20 MG/100ML; MG/100ML; MG/100ML; MG/100ML
125 INJECTION, SOLUTION INTRAVENOUS CONTINUOUS
Status: DISCONTINUED | OUTPATIENT
Start: 2018-11-27 | End: 2018-11-27 | Stop reason: HOSPADM

## 2018-11-27 RX ORDER — ONDANSETRON 2 MG/ML
4 INJECTION INTRAMUSCULAR; INTRAVENOUS AS NEEDED
Status: DISCONTINUED | OUTPATIENT
Start: 2018-11-27 | End: 2018-11-27 | Stop reason: HOSPADM

## 2018-11-27 RX ORDER — SODIUM CHLORIDE 0.9 % (FLUSH) 0.9 %
5-10 SYRINGE (ML) INJECTION AS NEEDED
Status: DISCONTINUED | OUTPATIENT
Start: 2018-11-27 | End: 2018-11-27 | Stop reason: HOSPADM

## 2018-11-27 RX ORDER — SODIUM CHLORIDE 0.9 % (FLUSH) 0.9 %
5-10 SYRINGE (ML) INJECTION EVERY 8 HOURS
Status: DISCONTINUED | OUTPATIENT
Start: 2018-11-27 | End: 2018-11-27 | Stop reason: HOSPADM

## 2018-11-27 NOTE — DISCHARGE INSTRUCTIONS
Patient Discharge Instructions    Pilar Quinn / [id number] : 1960    Admitted 2018 Discharged: 2018     Take Home Medications            · It is important that you take the medication exactly as they are prescribed. · Keep your medication in the bottles provided by the pharmacist and keep a list of the medication names, dosages, and times to be taken in your wallet. · Do not take other medications without consulting your doctor. What to do at Home    Recommended diet: Regular Diet    Recommended activity: Activity as tolerated    If you experience any of the following symptoms severe pain or bleeding, please follow up with Sade Rodriguez MD  .    Follow-up with Sade Rodriguez MD   in 10 day        Information obtained by :  I understand that if any problems occur once I am at home I am to contact my physician. I understand and acknowledge receipt of the instructions indicated above. [de-identified] or R.N.'s Signature                                                                  Date/Time                                                                                                                                              Patient or Representative Signature                                                          Date/Time    DISCHARGE SUMMARY from your Nurse    The following personal items collected during your admission are returned to you:   Dental Appliance: Dental Appliances: None  Vision: Visual Aid: Glasses  Hearing Aid:    Jewelry: Jewelry: None  Clothing: Clothing: Other (comment)(clothes to locker)  Other Valuables:  Other Valuables: Eyeglasses(given to )  Valuables sent to safe:      PATIENT INSTRUCTIONS:    After general anesthesia or intravenous sedation, for 24 hours or while taking prescription Narcotics:  · Limit your activities  · Do not drive and operate hazardous machinery  · Do not make important personal or business decisions  · Do  not drink alcoholic beverages  · If you have not urinated within 8 hours after discharge, please contact your surgeon on call. Report the following to your surgeon:  · Excessive pain, swelling, redness or odor of or around the surgical area  · Temperature over 100.5  · Nausea and vomiting lasting longer than 4 hours or if unable to take medications  · Any signs of decreased circulation or nerve impairment to extremity: change in color, persistent  numbness, tingling, coldness or increase pain  · Any questions    COUGH AND DEEP BREATHE    Breathing deep and coughing are very important exercises to do after surgery. Deep breathing and coughing open the little air tubes and air sacks in your lungs. You take deep breaths every day. You may not even notice - it is just something you do when you sigh or yawn. It is a natural exercise you do to keep these air passages open. After surgery, take deep breaths and cough, on purpose. Coughing and deep breathing help prevent bronchitis and pneumonia after surgery. If you had chest or belly surgery, use a pillow as a \"hug buddy\" and hold it tightly to your chest or belly when you cough. DIRECTIONS:  6. Take 10 to 15 slow deep breaths every hour while awake. 7. Breathe in deeply, and hold it for 2 seconds. 8. Exhale slowly through puckered lips, like blowing up a balloon. 9. After every 4th or 5th deep breath, hug your pillow to your chest or belly and give a hard, deep cough. Yes, it will probably hurt. But doing this exercise is very important part of healing after surgery. Take your pain medicine to help you do this exercise without too much pain. IF YOU HAVE BEEN DIAGNOSED WITH SLEEP APNEA, PLEASE USE YOUR SLEEP APNEA DEVICE OR CPAP MACHINE WHEN YOU INTEND TO NAP AFTER TAKING PAIN MEDICATION.     Ankle Pumps    Ankle pumps increase the circulation of oxygenated blood to your lower extremities and decrease your risk for circulation problems such as blood clots. They also stretch the muscles, tendons and ligaments in your foot and ankle, and prevent joint contracture in the ankle and foot, especially after surgeries on the legs. It is important to do ankle pump exercises regularly after surgery because immobility increases your risk for developing a blood clot. Your doctor may also have you take an Aspirin for the next few days as well. If your doctor did not ask you to take an Aspirin, consult with him before starting Aspirin therapy on your own. Slowly point your foot forward, feeling the muscles on the top of your lower leg stretch, and hold this position for 5 seconds. Next, pull your foot back toward you as far as possible, stretching the calf muscles, and hold that position for 5 seconds. Repeat with the other foot. Perform 10 repetitions every hour while awake for both ankles if possible (down and then up with the foot once is one repetition). You should feel gentle stretching of the muscles in your lower leg when doing this exercise. If you feel pain, or your range of motion is limited, don't  Push too hard. Only go the limit your joint and muscles will let you go. If you have increasing pain, progressively worsening leg warmth or swelling, STOP the exercise and call your doctor. Below is information about the medications your doctor is prescribing after your visit:    Other information in your discharge envelope:  []     PRESCRIPTIONS  []     PHYSICAL THERAPY PRESCRIPTION  []     APPOINTMENT CARDS  []     Regional Anesthesia Pamphlet for block or block with On-Q Catheter from Anesthesia Service  []     Medical device information sheets/pamphlets from their    []     School/work excuse note. []     /parent work excuse note.       These are general instructions for a healthy lifestyle:    *  Please give a list of your current medications to your Primary Care Provider. *  Please update this list whenever your medications are discontinued, doses are      changed, or new medications (including over-the-counter products) are added. *  Please carry medication information at all times in case of emergency situations. About Smoking  No smoking / No tobacco products / Avoid exposure to second hand smoke    Surgeon General's Warning:  Quitting smoking now greatly reduces serious risk to your health. Obesity, smoking, and sedentary lifestyle greatly increases your risk for illness and disease. A healthy diet, regular physical exercise & weight monitoring are important for maintaining a healthy lifestyle. Congestive Heart Failure  You may be retaining fluid if you have a history of heart failure or if you experience any of the following symptoms:  Weight gain of 3 pounds or more overnight or 5 pounds in a week, increased swelling in our hands or feet or shortness of breath while lying flat in bed. Please call your doctor as soon as you notice any of these symptoms; do not wait until your next office visit. Recognize signs and symptoms of STROKE:  F - face looks uneven  A - arms unable to move or move even  S - speech slurred or non-existent  T - time-call 911 as soon as signs and symptoms begin-DO NOT go         Back to bed or wait to see if you get better-TIME IS BRAIN. Warning signs of HEART ATTACK  Call 911 if you have these symptoms    · Chest discomfort. Most heart attacks involve discomfort in the center of the chest that lasts more than a few minutes, or that goes away and comes back. It can feel like uncomfortable pressure, squeezing, fullness, or pain. · Discomfort in other areas of the upper body. Symptoms can include pain or discomfort in one or both        Arms, the back, neck, jaw, or stomach.   ·  Shortness of breath with or without chest discomfort. · Other signs may include breaking out in a cold sweat, nausea, or lightheadedness    Don't wait more than five minutes to call 911 - MINUTES MATTER! Fast action can save your life. Calling 911 is almost always the fastest way to get lifesaving treatment. Emergency Medical Services staff can begin treatment when they arrive - up to an hour sooner than if someone gets to the hospital by car.

## 2018-11-27 NOTE — BRIEF OP NOTE
BRIEF OPERATIVE NOTE Date of Procedure: 11/27/2018 Preoperative Diagnosis: BACK CYST Postoperative Diagnosis: BACK CYST Procedure(s): EXCISION BACK CYST Surgeon(s) and Role: Thierno Patel MD - Primary Surgical Assistant:  
 
Surgical Staff: 
Circ-1: Yane Sexton RN Local Nurse Monitor: Colletta August, RN Scrub Tech-1: Kia Doty 
Surg Asst-1: Annie Moise RN Event Time In Time Out Incision Start 4943 Incision Close Anesthesia: Local  
Estimated Blood Loss: minimal 
Specimens:  
ID Type Source Tests Collected by Time Destination 1 : CYST BACK Preservative Back  Meron Swartz MD 11/27/2018 4829 Pathology Findings: scar tissue and small cyst on back Complications: none Implants: * No implants in log *

## 2018-11-27 NOTE — OP NOTES
Date of Procedure: 11/27/2018   Preoperative Diagnosis: BACK CYST  Postoperative Diagnosis: BACK CYST    Procedure(s):  EXCISION BACK CYST  Surgeon(s) and Role:     * Shlomo Najera MD - Primary         Surgical Assistant: Norma Hinson    Surgical Staff:  Circ-1: Valdemar Sexton RN  Local Nurse Monitor: Reagan Winn RN  Scrub Tech-1: Red Trivedi  Surg Asst-1: Kerrie Chand RN  Event Time In Time Out   Incision Start 8743    Incision Close       Anesthesia: Local   Estimated Blood Loss: minimal  Specimens:   ID Type Source Tests Collected by Time Destination   1 : CYST BACK Preservative Back  Shlomo Najera MD 11/27/2018 7371 Pathology      Findings: scar tissue and small cyst on back   Complications: none  Implants: * No implants in log *          Procedure     Patient was brought to the OR and placed on her side. A time out was completed. Local anesthesia was infiltrated using a combination of lidocaine, marcaine and sodium bicarbonate. A total of 25 ml was used. After ascertaining adequate analgesia, an elliptical incision 5 cm long was made widely encircling the area of scar and the previously noted cyst.     Excision was done using Bovie and sharp knife dissection. Full excision was accomplished. Hemostasis was secured and the incision was closed using deep 2/0 Vicryl and subcutaneous 3/0 Monocryl. Skin was closed with subcuticular 4/0 Monocryl and dermabond. Patient tolerated the procedure well. Counts were correct.

## 2022-01-28 ENCOUNTER — OFFICE VISIT (OUTPATIENT)
Dept: OBGYN CLINIC | Age: 62
End: 2022-01-28
Payer: COMMERCIAL

## 2022-01-28 VITALS
DIASTOLIC BLOOD PRESSURE: 74 MMHG | WEIGHT: 204.13 LBS | BODY MASS INDEX: 34.85 KG/M2 | SYSTOLIC BLOOD PRESSURE: 122 MMHG | HEIGHT: 64 IN

## 2022-01-28 DIAGNOSIS — N94.10 DYSPAREUNIA IN FEMALE: ICD-10-CM

## 2022-01-28 DIAGNOSIS — Z01.419 ROUTINE GYNECOLOGICAL EXAMINATION: ICD-10-CM

## 2022-01-28 DIAGNOSIS — N95.1 MENOPAUSAL SYNDROME: ICD-10-CM

## 2022-01-28 DIAGNOSIS — Z12.4 SCREENING FOR MALIGNANT NEOPLASM OF CERVIX: Primary | ICD-10-CM

## 2022-01-28 PROCEDURE — 99396 PREV VISIT EST AGE 40-64: CPT | Performed by: OBSTETRICS & GYNECOLOGY

## 2022-01-28 RX ORDER — ESTRADIOL 0.1 MG/G
CREAM VAGINAL
Qty: 45 G | Refills: 5 | Status: SHIPPED | OUTPATIENT
Start: 2022-01-28

## 2022-01-28 NOTE — PROGRESS NOTES
Yovana Sepulveda is a , 57 y.o. female   No LMP recorded. Patient has had an ablation. She presents for her annual    She is having pain with intercourse -only on the anterior wall. Menstrual status:  Cycles are menopausal.    Flow: absent. She does not have dysmenorrhea. Medical conditions:  Since her last annual GYN exam about one year ago, she has not the following changes in her health history: none. Mammogram History:    DARNELL Results (most recent):  No results found for this or any previous visit. DEXA Results (most recent):  No results found for this or any previous visit. Past Medical History:   Diagnosis Date    Cancer [facility])     parathyroid ca    Cancer [facility])     cervix    Chronic pain     back    Essential hypertension     Hyperlipidemia     trying to watch diet.  Hypertension     Ill-defined condition 2017    obesity- BMI 32.3    Osteoporosis 2010     Past Surgical History:   Procedure Laterality Date    HX ADENOIDECTOMY      HX GI  ~    colonoscopy    HX GYN      c section x 3    HX GYN  10/2010    uterine ablation    HX HEART CATHETERIZATION  2011    no blockages    HX HEENT      parathyroid, saliva glands removed    HX HEENT      mastoidectomy    HX ORTHOPAEDIC      lower back surgery    HX ORTHOPAEDIC      C6-C7 ACDF    HX ORTHOPAEDIC  2015    C5-C6 ACDF/ remove anterior instrumentation C6-C7    HX OTHER SURGICAL      lipoma from r rib    HX TONSILLECTOMY      HX TUBAL LIGATION      HX UROLOGICAL  2012    bladder sling inserted then removed    HX UROLOGICAL  2013    bladder sling inserted       Prior to Admission medications    Medication Sig Start Date End Date Taking?  Authorizing Provider   estradioL (ESTRACE) 0.01 % (0.1 mg/gram) vaginal cream 1 applicator full BIW at night 22  Yes Ricardo Agudelo MD       Allergies   Allergen Reactions    Fosamax [Alendronate] Myalgia Muscle weakness and dysphagia    Hydromorphone Hives and Itching    Bc [Aspirin-Salicylamide-Caffeine] Hives    Boniva [Ibandronate] Myalgia     Muscle weakness and dysphagia    Iodine Rash          Tobacco History:  reports that she has never smoked. She has never used smokeless tobacco.  Alcohol Abuse:  reports current alcohol use. Drug Abuse:  reports no history of drug use. Family Medical/Cancer History:   Family History   Problem Relation Age of Onset    Cancer Father         Colon cancer, stomach, melanoma    Heart Attack Mother 54    Coronary Art Dis Mother     Heart Surgery Mother 54        coronary stents    Diabetes Mother     Hypertension Mother     High Cholesterol Sister     Other Sister         lower back pain    Kidney Disease Sister         congenital    Heart Disease Maternal Aunt     Emphysema Maternal Aunt     Other Maternal Aunt         AAA    Other Paternal Aunt         ALS    Cancer Paternal Uncle         uncles- with hx of brain, colon, lung          Review of Systems   Constitutional: Negative for chills, fever, malaise/fatigue and weight loss. HENT: Negative for congestion, ear pain, sinus pain and tinnitus. Eyes: Negative for blurred vision and double vision. Respiratory: Negative for cough, shortness of breath and wheezing. Cardiovascular: Negative for chest pain and palpitations. Gastrointestinal: Negative for abdominal pain, blood in stool, constipation, diarrhea, heartburn, nausea and vomiting. Genitourinary: Negative for dysuria, flank pain, frequency, hematuria and urgency. Musculoskeletal: Negative for joint pain and myalgias. Skin: Negative for itching and rash. Neurological: Negative for dizziness, weakness and headaches. Psychiatric/Behavioral: Negative for depression, memory loss and suicidal ideas. The patient is not nervous/anxious and does not have insomnia. Physical Exam  Constitutional:       Appearance: Normal appearance. HENT:      Head: Normocephalic and atraumatic. Cardiovascular:      Rate and Rhythm: Normal rate. Heart sounds: Normal heart sounds. Pulmonary:      Effort: Pulmonary effort is normal.      Breath sounds: Normal breath sounds. Chest:   Breasts:      Right: Normal.      Left: Normal.       Abdominal:      General: Abdomen is flat. Palpations: Abdomen is soft. Genitourinary:     General: Normal vulva. Vagina: Normal.      Cervix: Normal.      Uterus: Normal.       Adnexa: Right adnexa normal and left adnexa normal.      Rectum: Normal.            Comments: PAP Obtained    Tenderness over the vaginal sling approx 1 inch into the vagina  Neurological:      Mental Status: She is alert. Psychiatric:         Mood and Affect: Mood normal.         Behavior: Behavior normal.         Thought Content: Thought content normal.          Visit Vitals  /74 (BP 1 Location: Left upper arm, BP Patient Position: Sitting, BP Cuff Size: Large adult)   Ht 5' 3.5\" (1.613 m)   Wt 204 lb 2 oz (92.6 kg)   BMI 35.59 kg/m²         Assessment:   Diagnoses and all orders for this visit:    1. Screening for malignant neoplasm of cervix  -     PAP IG, RFX APTIMA HPV ASCUS ([id number])    2. Routine gynecological examination  -     PAP IG, RFX APTIMA HPV ASCUS ([id number])    3. Menopausal syndrome    4. Dyspareunia in female    Other orders  -     estradioL (ESTRACE) 0.01 % (0.1 mg/gram) vaginal cream; 1 applicator full BIW at night    Options DWP re: anatomy,sling, dyspareunia    Plan: Questions addressed  Counseled re: diet, exercise, healthy lifestyle  Return for Annual  Rec annual mammogram        Follow-up and Dispositions    · Return for 1 yr annual, 1 yr mammo.

## 2022-01-28 NOTE — PROGRESS NOTES
Chief Complaint   Patient presents with    Annual Exam     mammo done here today     Visit Vitals  /74 (BP 1 Location: Left upper arm, BP Patient Position: Sitting, BP Cuff Size: Large adult)   Ht 5' 3.5\" (1.613 m)   Wt 204 lb 2 oz (92.6 kg)   BMI 35.59 kg/m²

## 2022-01-31 LAB
CYTOLOGIST CVX/VAG CYTO: NORMAL
CYTOLOGY CVX/VAG DOC CYTO: NORMAL
CYTOLOGY CVX/VAG DOC THIN PREP: NORMAL
DX ICD CODE: NORMAL
LABCORP, 190119: NORMAL
Lab: NORMAL
OTHER STN SPEC: NORMAL
STAT OF ADQ CVX/VAG CYTO-IMP: NORMAL

## 2022-03-18 PROBLEM — M48.062 LUMBAR STENOSIS WITH NEUROGENIC CLAUDICATION: Status: ACTIVE | Noted: 2017-05-01

## 2022-03-18 PROBLEM — M48.061 LUMBAR STENOSIS: Status: ACTIVE | Noted: 2017-05-01

## 2023-05-10 RX ORDER — ESTRADIOL 0.1 MG/G
CREAM VAGINAL
COMMUNITY
Start: 2022-01-28

## 2023-10-04 ENCOUNTER — OFFICE VISIT (OUTPATIENT)
Age: 63
End: 2023-10-04
Payer: COMMERCIAL

## 2023-10-04 VITALS
BODY MASS INDEX: 35 KG/M2 | WEIGHT: 205 LBS | HEIGHT: 64 IN | SYSTOLIC BLOOD PRESSURE: 132 MMHG | DIASTOLIC BLOOD PRESSURE: 74 MMHG

## 2023-10-04 DIAGNOSIS — M85.80 OSTEOPENIA, UNSPECIFIED LOCATION: ICD-10-CM

## 2023-10-04 DIAGNOSIS — Z01.419 GYNECOLOGIC EXAM NORMAL: ICD-10-CM

## 2023-10-04 DIAGNOSIS — Z12.4 PAP SMEAR FOR CERVICAL CANCER SCREENING: Primary | ICD-10-CM

## 2023-10-04 DIAGNOSIS — N95.1 MENOPAUSAL SYNDROME: ICD-10-CM

## 2023-10-04 PROCEDURE — 3078F DIAST BP <80 MM HG: CPT | Performed by: OBSTETRICS & GYNECOLOGY

## 2023-10-04 PROCEDURE — 3075F SYST BP GE 130 - 139MM HG: CPT | Performed by: OBSTETRICS & GYNECOLOGY

## 2023-10-04 PROCEDURE — 99396 PREV VISIT EST AGE 40-64: CPT | Performed by: OBSTETRICS & GYNECOLOGY

## 2023-10-04 RX ORDER — ESTRADIOL 0.1 MG/G
CREAM VAGINAL
Qty: 42.5 G | Refills: 5 | Status: SHIPPED | OUTPATIENT
Start: 2023-10-04

## 2023-10-04 ASSESSMENT — ENCOUNTER SYMPTOMS
GASTROINTESTINAL NEGATIVE: 1
RESPIRATORY NEGATIVE: 1

## 2023-10-04 NOTE — PROGRESS NOTES
Mejia Smith is a [id number], 58 y.o. female   No LMP recorded. Patient is postmenopausal.    She presents for her annual    She is having no significant problems. Menstrual status:  Cycles are menopausal.    Flow: absent. She does not have dysmenorrhea. Medical conditions:  Since her last annual GYN exam about one year ago, she has not the following changes in her health history: none. Mammogram History:    LALY Results (most recent):  @[facility](TWO8765:1)@     DEXA Results (most recent):  @[facility](PLH8057:1)@       Past Medical History:   Diagnosis Date    Cancer ([address]) 2011    cervix    Cancer ([address]) 1985    parathyroid ca    Chronic pain     back    Essential hypertension     Hyperlipidemia     trying to watch diet. Hypertension     Ill-defined condition 04/25/2017    obesity- BMI 32.3    Osteoporosis 12/28/2010     Past Surgical History:   Procedure Laterality Date    ADENOIDECTOMY      CARDIAC CATHETERIZATION  11/17/2011    no blockages    GI      2012    GYN      c section x 3    GYN  10/2010    uterine ablation    HEENT  1985    parathyroid, saliva glands removed    HEENT      mastoidectomy    ORTHOPEDIC SURGERY  12/2015    C5-C6 ACDF/ remove anterior instrumentation C6-C7    ORTHOPEDIC SURGERY  2012    C6-C7 ACDF    ORTHOPEDIC SURGERY  1999    lower back surgery    OTHER SURGICAL HISTORY      lipoma from r rib    [address] [employer]  5/2013    bladder sling inserted    UROLOGICAL SURGERY  5/2012    bladder sling inserted then removed       Prior to Admission medications    Medication Sig Start Date End Date Taking?  Authorizing Provider   estradiol (ESTRACE) 0.1 MG/GM vaginal cream 1 applicator full BIW at night 10/4/23  Yes Andres Su MD       Allergies   Allergen Reactions    Alendronate Myalgia     Muscle weakness and dysphagia    Hydromorphone Hives and Itching    Aspirin-Salicylamide-Caffeine Hives    Ibandronic Acid

## 2023-10-06 LAB
., LABCORP: NORMAL
CYTOLOGIST CVX/VAG CYTO: NORMAL
CYTOLOGY CVX/VAG DOC CYTO: NORMAL
CYTOLOGY CVX/VAG DOC THIN PREP: NORMAL
DX ICD CODE: NORMAL
Lab: NORMAL
OTHER STN SPEC: NORMAL
STAT OF ADQ CVX/VAG CYTO-IMP: NORMAL

## 2023-12-20 ENCOUNTER — TELEPHONE (OUTPATIENT)
Age: 63
End: 2023-12-20

## 2023-12-29 DIAGNOSIS — M85.80 OSTEOPENIA, UNSPECIFIED LOCATION: ICD-10-CM

## 2024-01-02 ENCOUNTER — TELEPHONE (OUTPATIENT)
Age: 64
End: 2024-01-02

## 2024-01-02 NOTE — TELEPHONE ENCOUNTER
DEXA: Forearm: T-score: -4.1, Bilateral hips: -1.5  Currently on ERT,tried other therapy in the past and could not tolerate( Fosamax, etc. Never tried Prolia)  Stressed Ca/Vit.D/Exercise, decrease any fall risks  Options DWP     Rescan in 2 yrs

## (undated) DEVICE — SOL IRRIGATION INJ NACL 0.9% 500ML BTL

## (undated) DEVICE — MAGNETIC DRAPE: Brand: DEVON

## (undated) DEVICE — INFECTION CONTROL KIT SYS

## (undated) DEVICE — COVER,TABLE,60X90,STERILE: Brand: MEDLINE

## (undated) DEVICE — (D)PREP SKN CHLRAPRP APPL 26ML -- CONVERT TO ITEM 371833

## (undated) DEVICE — STERILE POLYISOPRENE POWDER-FREE SURGICAL GLOVES: Brand: PROTEXIS

## (undated) DEVICE — SYSTEM SKIN CLSR 22CM DERMBND PRINEO

## (undated) DEVICE — DRAPE,UTILITY,TAPE,15X26,STERILE: Brand: MEDLINE

## (undated) DEVICE — SUTURE MCRYL SZ 3-0 L27IN ABSRB UD L24MM PS-1 3/8 CIR PRIM Y936H

## (undated) DEVICE — Device

## (undated) DEVICE — CHEST PACK: Brand: MEDLINE INDUSTRIES, INC.

## (undated) DEVICE — NEEDLE HYPO 22GA L1.5IN BLK S STL HUB POLYPR SHLD REG BVL

## (undated) DEVICE — DEVON™ KNEE AND BODY STRAP 60" X 3" (1.5 M X 7.6 CM): Brand: DEVON

## (undated) DEVICE — ASTOUND STANDARD SURGICAL GOWN, XL: Brand: CONVERTORS

## (undated) DEVICE — APPLICATOR BNDG 1MM ADH PREMIERPRO EXOFIN

## (undated) DEVICE — 3000CC GUARDIAN II: Brand: GUARDIAN

## (undated) DEVICE — KENDALL SCD EXPRESS SLEEVES, KNEE LENGTH, MEDIUM: Brand: KENDALL SCD

## (undated) DEVICE — SUTURE MCRYL SZ 3-0 L27IN ABSRB UD L26MM SH 1/2 CIR Y416H

## (undated) DEVICE — Z CONVERTED USE 2271043 CONTAINER SPEC COLL 4OZ SCR ON LID PEEL PCH

## (undated) DEVICE — SUTURE STRATAFIX SPRL SZ 1 L5IN ABSRB VLT CT-1 L36MM 1/2 SXPD2B401

## (undated) DEVICE — (D)SYR 10ML 1/5ML GRAD NSAF -- PKGING CHANGE USE ITEM 338027

## (undated) DEVICE — TRAY CATH OD16FR SIL URIN M STATLOK STBL DEV SURSTP

## (undated) DEVICE — MEDI-VAC NON-CONDUCTIVE SUCTION TUBING: Brand: CARDINAL HEALTH

## (undated) DEVICE — NDL SPNE QNCKE 18GX3.5IN LF --

## (undated) DEVICE — INTENDED FOR TISSUE SEPARATION, AND OTHER PROCEDURES THAT REQUIRE A SHARP SURGICAL BLADE TO PUNCTURE OR CUT.: Brand: BARD-PARKER ® CARBON RIB-BACK BLADES

## (undated) DEVICE — ROCKER SWITCH PENCIL BLADE ELECTRODE, HOLSTER: Brand: EDGE

## (undated) DEVICE — BIPOLAR FORCEPS CORD,BANANA LEADS: Brand: VALLEYLAB

## (undated) DEVICE — TOWEL SURG W17XL27IN STD BLU COT NONFENESTRATED PREWASHED

## (undated) DEVICE — THE MILL DISPOSABLE - MEDIUM

## (undated) DEVICE — SUTURE VCRL SZ 1 L36IN ABSRB UD L36MM CT-1 1/2 CIR J947H

## (undated) DEVICE — AMD ANTIMICROBIAL DRAIN SPONGES, 6 PLY, 0.2% POLYHEXAMETHYLENE BIGUANIDE HCI (PHMB): Brand: EXCILON

## (undated) DEVICE — ACCY PA100-A LEGEND LUB/DIFFUSER 4 PACK: Brand: MIDAS REX®

## (undated) DEVICE — SUTURE VCRL SZ 0 L36IN ABSRB UD L36MM CT-1 1/2 CIR J946H

## (undated) DEVICE — TOOL 14MH30 LEGEND 14CM 3MM: Brand: MIDAS REX ™

## (undated) DEVICE — 1010 S-DRAPE TOWEL DRAPE 10/BX: Brand: STERI-DRAPE™

## (undated) DEVICE — CATHETER IV 14GA L1.25IN TEF STR HUB INTROCAN SFTY

## (undated) DEVICE — TIP CLEANER: Brand: VALLEYLAB

## (undated) DEVICE — KIT POS W/ FOAM ARM CRADL SHEARGUARD CHST PD CVR FOR SPNL

## (undated) DEVICE — SYR 10ML LUER LOK 1/5ML GRAD --

## (undated) DEVICE — STERILE POLYISOPRENE POWDER-FREE SURGICAL GLOVES WITH EMOLLIENT COATING: Brand: PROTEXIS

## (undated) DEVICE — SUTURE ABSORBABLE BRAIDED 2-0 CT-1 27 IN UD VICRYL J259H

## (undated) DEVICE — BONE WAX WHITE: Brand: BONE WAX WHITE

## (undated) DEVICE — ELECTRODE NDL 2.8IN COAT VALLEYLAB

## (undated) DEVICE — CODMAN® SURGICAL PATTIES 3/4" X 3/4" (1.91CM X 1.91CM): Brand: CODMAN®

## (undated) DEVICE — PLUS HANDPIECE WITH SUCTION TUBING AND TRAUMA TIP WITH SMALL SOFT CONE: Brand: SURGILAV

## (undated) DEVICE — SUTURE MCRYL SZ 4-0 L27IN ABSRB UD L19MM PS-2 1/2 CIR PRIM Y426H

## (undated) DEVICE — ELECTRODE BLDE L4IN NONINSULATED EDGE

## (undated) DEVICE — LIGHT HANDLE: Brand: DEVON

## (undated) DEVICE — DRAPE XR C ARM 41X74IN LF --

## (undated) DEVICE — SYR LR LCK 1ML GRAD NSAF 30ML --

## (undated) DEVICE — DRAIN KT WND 10FR RND 400ML --

## (undated) DEVICE — DRAPE,REIN 53X77,STERILE: Brand: MEDLINE

## (undated) DEVICE — SUTURE VCRL SZ 0 L27IN ABSRB UD L26MM CT-2 1/2 CIR J270H

## (undated) DEVICE — WATERPROOF, BACTERIA PROOF DRESSING WITH ABSORBENT SEE THROUGH PAD: Brand: OPSITE POST-OP VISIBLE 25X10CM CTN 20

## (undated) DEVICE — LAMINECTOMY RICHMOND-LF: Brand: MEDLINE INDUSTRIES, INC.

## (undated) DEVICE — STOPCOCK IV 3W --

## (undated) DEVICE — SYRINGE MED 20ML STD CLR PLAS LUERLOCK TIP N CTRL DISP

## (undated) DEVICE — SOLUTION IRRIG 1000ML H2O STRL BLT